# Patient Record
Sex: MALE | Race: WHITE | Employment: FULL TIME | URBAN - METROPOLITAN AREA
[De-identification: names, ages, dates, MRNs, and addresses within clinical notes are randomized per-mention and may not be internally consistent; named-entity substitution may affect disease eponyms.]

---

## 2021-02-26 ENCOUNTER — HOSPITAL ENCOUNTER (EMERGENCY)
Age: 41
Discharge: LEFT BEFORE DISCHARGE | End: 2021-02-26

## 2021-02-26 VITALS
TEMPERATURE: 97.9 F | DIASTOLIC BLOOD PRESSURE: 96 MMHG | WEIGHT: 145 LBS | RESPIRATION RATE: 18 BRPM | OXYGEN SATURATION: 100 % | BODY MASS INDEX: 23.3 KG/M2 | SYSTOLIC BLOOD PRESSURE: 108 MMHG | HEART RATE: 112 BPM | HEIGHT: 66 IN

## 2021-02-26 PROCEDURE — 80053 COMPREHEN METABOLIC PANEL: CPT

## 2021-02-26 PROCEDURE — 4500000002 HC ER NO CHARGE

## 2021-02-26 RX ORDER — 0.9 % SODIUM CHLORIDE 0.9 %
1000 INTRAVENOUS SOLUTION INTRAVENOUS ONCE
Status: DISCONTINUED | OUTPATIENT
Start: 2021-02-26 | End: 2021-02-27 | Stop reason: HOSPADM

## 2021-02-26 RX ORDER — ONDANSETRON 2 MG/ML
4 INJECTION INTRAMUSCULAR; INTRAVENOUS EVERY 6 HOURS PRN
Status: DISCONTINUED | OUTPATIENT
Start: 2021-02-26 | End: 2021-02-27 | Stop reason: HOSPADM

## 2021-02-26 RX ORDER — KETOROLAC TROMETHAMINE 30 MG/ML
30 INJECTION, SOLUTION INTRAMUSCULAR; INTRAVENOUS ONCE
Status: DISCONTINUED | OUTPATIENT
Start: 2021-02-26 | End: 2021-02-27 | Stop reason: HOSPADM

## 2021-02-27 NOTE — ED NOTES
Rounded on patient in Ed waiting area. Apologizes given for delays, patient states \" im just going to leave then, im going to go to [place]\". Explained to patient delays/ Patient refused to sign paperwork. Patient was seen by [facility] provider.       Saqib Babb, RN  02/26/21 0467

## 2021-02-27 NOTE — ED NOTES
Patient presents to Ed with complaints of emesis. Reports pain with emesis. Reports this has been occurring about monthly.  Concerned for dehydration      Roland Trimble RN  02/26/21 3644

## 2021-02-27 NOTE — ED PROVIDER NOTES
As physician-in-triage, I performed a medical screening history and physical exam on this patient. HISTORY OF PRESENT ILLNESS  Cielo Santana is a 47 y.o. male nausea vomiting diarrhea for the past 24 hours. Patient reports this is a recurrent problem occurs approximately once a month. Patient is unsure what causes it. No known medical problems. No prior belly surgeries. Patient does occasionally drink alcohol. Daily marijuana use. PHYSICAL EXAM  BP (!) 108/96   Pulse 112   Temp 97.9 °F (36.6 °C) (Oral)   Resp 18   Ht 5' 6\" (1.676 m)   Wt 145 lb (65.8 kg)   SpO2 100%   BMI 23.40 kg/m²     On exam, the patient appears in no acute distress. Speech is clear. Breathing is unlabored. Moves all extremities    Comment: Please note this report has been produced using speech recognition software and may contain errors related to that system including errors in grammar, punctuation, and spelling, as well as words and phrases that may be inappropriate. If there are any questions or concerns please feel free to contact the dictating provider for clarification.        Pamela Talley MD  02/26/21 7639

## 2021-04-12 ENCOUNTER — HOSPITAL ENCOUNTER (OUTPATIENT)
Age: 41
Setting detail: OBSERVATION
Discharge: HOME | End: 2021-04-12
Attending: INTERNAL MEDICINE | Admitting: INTERNAL MEDICINE
Payer: MEDICARE

## 2021-04-12 ENCOUNTER — APPOINTMENT (OUTPATIENT)
Dept: CT IMAGING | Age: 41
End: 2021-04-12

## 2021-04-12 VITALS
DIASTOLIC BLOOD PRESSURE: 61 MMHG | SYSTOLIC BLOOD PRESSURE: 93 MMHG | TEMPERATURE: 98.4 F | BODY MASS INDEX: 22.5 KG/M2 | OXYGEN SATURATION: 97 % | HEIGHT: 66 IN | HEART RATE: 61 BPM | WEIGHT: 140 LBS | RESPIRATION RATE: 16 BRPM

## 2021-04-12 DIAGNOSIS — R19.7 DIARRHEA, UNSPECIFIED TYPE: ICD-10-CM

## 2021-04-12 DIAGNOSIS — R11.2 NON-INTRACTABLE VOMITING WITH NAUSEA, UNSPECIFIED VOMITING TYPE: Primary | ICD-10-CM

## 2021-04-12 DIAGNOSIS — E87.6 HYPOKALEMIA: ICD-10-CM

## 2021-04-12 DIAGNOSIS — R10.12 LEFT UPPER QUADRANT ABDOMINAL PAIN: ICD-10-CM

## 2021-04-12 LAB
ALBUMIN SERPL-MCNC: 5.1 GM/DL (ref 3.4–5)
ALP BLD-CCNC: 136 IU/L (ref 40–129)
ALT SERPL-CCNC: 19 U/L (ref 10–40)
ANION GAP SERPL CALCULATED.3IONS-SCNC: 11 MMOL/L (ref 4–16)
ANION GAP SERPL CALCULATED.3IONS-SCNC: 17 MMOL/L (ref 4–16)
ANION GAP SERPL CALCULATED.3IONS-SCNC: 9 MMOL/L (ref 4–16)
AST SERPL-CCNC: 20 IU/L (ref 15–37)
BASE EXCESS MIXED: 8.5 (ref 0–1.2)
BASOPHILS ABSOLUTE: 0 K/CU MM
BASOPHILS RELATIVE PERCENT: 0.2 % (ref 0–1)
BILIRUB SERPL-MCNC: 0.4 MG/DL (ref 0–1)
BUN BLDV-MCNC: 14 MG/DL (ref 6–23)
BUN BLDV-MCNC: 17 MG/DL (ref 6–23)
BUN BLDV-MCNC: 17 MG/DL (ref 6–23)
CALCIUM SERPL-MCNC: 10.6 MG/DL (ref 8.3–10.6)
CALCIUM SERPL-MCNC: 8.9 MG/DL (ref 8.3–10.6)
CALCIUM SERPL-MCNC: 8.9 MG/DL (ref 8.3–10.6)
CHLORIDE BLD-SCNC: 82 MMOL/L (ref 99–110)
CHLORIDE BLD-SCNC: 91 MMOL/L (ref 99–110)
CHLORIDE BLD-SCNC: 96 MMOL/L (ref 99–110)
CO2: 29 MMOL/L (ref 21–32)
CO2: 34 MMOL/L (ref 21–32)
CO2: 39 MMOL/L (ref 21–32)
COMMENT: ABNORMAL
CREAT SERPL-MCNC: 1 MG/DL (ref 0.9–1.3)
CREAT SERPL-MCNC: 1.1 MG/DL (ref 0.9–1.3)
CREAT SERPL-MCNC: 1.4 MG/DL (ref 0.9–1.3)
DIFFERENTIAL TYPE: ABNORMAL
EOSINOPHILS ABSOLUTE: 0 K/CU MM
EOSINOPHILS RELATIVE PERCENT: 0 % (ref 0–3)
GFR AFRICAN AMERICAN: >60 ML/MIN/1.73M2
GFR NON-AFRICAN AMERICAN: 57 ML/MIN/1.73M2
GFR NON-AFRICAN AMERICAN: >60 ML/MIN/1.73M2
GFR NON-AFRICAN AMERICAN: >60 ML/MIN/1.73M2
GLUCOSE BLD-MCNC: 114 MG/DL (ref 70–99)
GLUCOSE BLD-MCNC: 173 MG/DL (ref 70–99)
GLUCOSE BLD-MCNC: 96 MG/DL (ref 70–99)
HCO3 VENOUS: 31.8 MMOL/L (ref 19–25)
HCT VFR BLD CALC: 52.4 % (ref 42–52)
HEMOGLOBIN: 18.2 GM/DL (ref 13.5–18)
IMMATURE NEUTROPHIL %: 0.4 % (ref 0–0.43)
LIPASE: 14 IU/L (ref 13–60)
LYMPHOCYTES ABSOLUTE: 1.5 K/CU MM
LYMPHOCYTES RELATIVE PERCENT: 9.2 % (ref 24–44)
MAGNESIUM: 2 MG/DL (ref 1.8–2.4)
MCH RBC QN AUTO: 30.6 PG (ref 27–31)
MCHC RBC AUTO-ENTMCNC: 34.7 % (ref 32–36)
MCV RBC AUTO: 88.2 FL (ref 78–100)
MONOCYTES ABSOLUTE: 0.9 K/CU MM
MONOCYTES RELATIVE PERCENT: 5.8 % (ref 0–4)
NUCLEATED RBC %: 0 %
O2 SAT, VEN: 92.9 % (ref 50–70)
PCO2, VEN: 38 MMHG (ref 38–52)
PDW BLD-RTO: 13.6 % (ref 11.7–14.9)
PH VENOUS: 7.53 (ref 7.32–7.42)
PLATELET # BLD: 366 K/CU MM (ref 140–440)
PMV BLD AUTO: 9.8 FL (ref 7.5–11.1)
PO2, VEN: 178 MMHG (ref 28–48)
POTASSIUM SERPL-SCNC: 2.9 MMOL/L (ref 3.5–5.1)
POTASSIUM SERPL-SCNC: 3.8 MMOL/L (ref 3.5–5.1)
POTASSIUM SERPL-SCNC: 3.9 MMOL/L (ref 3.5–5.1)
RBC # BLD: 5.94 M/CU MM (ref 4.6–6.2)
SEGMENTED NEUTROPHILS ABSOLUTE COUNT: 13.2 K/CU MM
SEGMENTED NEUTROPHILS RELATIVE PERCENT: 84.4 % (ref 36–66)
SODIUM BLD-SCNC: 134 MMOL/L (ref 135–145)
SODIUM BLD-SCNC: 136 MMOL/L (ref 135–145)
SODIUM BLD-SCNC: 138 MMOL/L (ref 135–145)
TOTAL IMMATURE NEUTOROPHIL: 0.06 K/CU MM
TOTAL NUCLEATED RBC: 0 K/CU MM
TOTAL PROTEIN: 8.6 GM/DL (ref 6.4–8.2)
TROPONIN T: <0.01 NG/ML
WBC # BLD: 15.7 K/CU MM (ref 4–10.5)

## 2021-04-12 PROCEDURE — 84484 ASSAY OF TROPONIN QUANT: CPT

## 2021-04-12 PROCEDURE — 74177 CT ABD & PELVIS W/CONTRAST: CPT

## 2021-04-12 PROCEDURE — 80048 BASIC METABOLIC PNL TOTAL CA: CPT

## 2021-04-12 PROCEDURE — 99285 EMERGENCY DEPT VISIT HI MDM: CPT

## 2021-04-12 PROCEDURE — 83735 ASSAY OF MAGNESIUM: CPT

## 2021-04-12 PROCEDURE — G0378 HOSPITAL OBSERVATION PER HR: HCPCS

## 2021-04-12 PROCEDURE — 96374 THER/PROPH/DIAG INJ IV PUSH: CPT

## 2021-04-12 PROCEDURE — 83690 ASSAY OF LIPASE: CPT

## 2021-04-12 PROCEDURE — 6360000002 HC RX W HCPCS: Performed by: PHYSICIAN ASSISTANT

## 2021-04-12 PROCEDURE — 36415 COLL VENOUS BLD VENIPUNCTURE: CPT

## 2021-04-12 PROCEDURE — 2580000003 HC RX 258: Performed by: EMERGENCY MEDICINE

## 2021-04-12 PROCEDURE — 85025 COMPLETE CBC W/AUTO DIFF WBC: CPT

## 2021-04-12 PROCEDURE — 2500000003 HC RX 250 WO HCPCS: Performed by: EMERGENCY MEDICINE

## 2021-04-12 PROCEDURE — 6360000004 HC RX CONTRAST MEDICATION: Performed by: EMERGENCY MEDICINE

## 2021-04-12 PROCEDURE — 96372 THER/PROPH/DIAG INJ SC/IM: CPT

## 2021-04-12 PROCEDURE — 96376 TX/PRO/DX INJ SAME DRUG ADON: CPT

## 2021-04-12 PROCEDURE — 80076 HEPATIC FUNCTION PANEL: CPT

## 2021-04-12 PROCEDURE — 82805 BLOOD GASES W/O2 SATURATION: CPT

## 2021-04-12 PROCEDURE — 80053 COMPREHEN METABOLIC PANEL: CPT

## 2021-04-12 PROCEDURE — 2580000003 HC RX 258: Performed by: PHYSICIAN ASSISTANT

## 2021-04-12 PROCEDURE — 94761 N-INVAS EAR/PLS OXIMETRY MLT: CPT

## 2021-04-12 PROCEDURE — 93010 ELECTROCARDIOGRAM REPORT: CPT | Performed by: INTERNAL MEDICINE

## 2021-04-12 PROCEDURE — 96361 HYDRATE IV INFUSION ADD-ON: CPT

## 2021-04-12 PROCEDURE — 96375 TX/PRO/DX INJ NEW DRUG ADDON: CPT

## 2021-04-12 PROCEDURE — 6360000002 HC RX W HCPCS: Performed by: EMERGENCY MEDICINE

## 2021-04-12 PROCEDURE — 93005 ELECTROCARDIOGRAM TRACING: CPT | Performed by: EMERGENCY MEDICINE

## 2021-04-12 PROCEDURE — 6360000002 HC RX W HCPCS: Performed by: INTERNAL MEDICINE

## 2021-04-12 RX ORDER — SODIUM CHLORIDE 0.9 % (FLUSH) 0.9 %
5-40 SYRINGE (ML) INJECTION PRN
Status: DISCONTINUED | OUTPATIENT
Start: 2021-04-12 | End: 2021-04-13 | Stop reason: HOSPADM

## 2021-04-12 RX ORDER — POLYETHYLENE GLYCOL 3350 17 G/17G
17 POWDER, FOR SOLUTION ORAL DAILY PRN
Status: DISCONTINUED | OUTPATIENT
Start: 2021-04-12 | End: 2021-04-13 | Stop reason: HOSPADM

## 2021-04-12 RX ORDER — POTASSIUM CHLORIDE 7.45 MG/ML
10 INJECTION INTRAVENOUS PRN
Status: DISCONTINUED | OUTPATIENT
Start: 2021-04-12 | End: 2021-04-12 | Stop reason: SDUPTHER

## 2021-04-12 RX ORDER — 0.9 % SODIUM CHLORIDE 0.9 %
1000 INTRAVENOUS SOLUTION INTRAVENOUS ONCE
Status: COMPLETED | OUTPATIENT
Start: 2021-04-12 | End: 2021-04-12

## 2021-04-12 RX ORDER — SODIUM CHLORIDE 9 MG/ML
INJECTION, SOLUTION INTRAVENOUS CONTINUOUS
Status: DISCONTINUED | OUTPATIENT
Start: 2021-04-12 | End: 2021-04-13 | Stop reason: HOSPADM

## 2021-04-12 RX ORDER — PROMETHAZINE HYDROCHLORIDE 25 MG/1
12.5 TABLET ORAL EVERY 6 HOURS PRN
Status: DISCONTINUED | OUTPATIENT
Start: 2021-04-12 | End: 2021-04-13 | Stop reason: HOSPADM

## 2021-04-12 RX ORDER — POTASSIUM CHLORIDE 7.45 MG/ML
10 INJECTION INTRAVENOUS PRN
Status: DISCONTINUED | OUTPATIENT
Start: 2021-04-12 | End: 2021-04-13 | Stop reason: HOSPADM

## 2021-04-12 RX ORDER — SODIUM CHLORIDE 0.9 % (FLUSH) 0.9 %
5-40 SYRINGE (ML) INJECTION EVERY 12 HOURS SCHEDULED
Status: DISCONTINUED | OUTPATIENT
Start: 2021-04-12 | End: 2021-04-13 | Stop reason: HOSPADM

## 2021-04-12 RX ORDER — DIPHENHYDRAMINE HYDROCHLORIDE 50 MG/ML
50 INJECTION INTRAMUSCULAR; INTRAVENOUS ONCE
Status: COMPLETED | OUTPATIENT
Start: 2021-04-12 | End: 2021-04-12

## 2021-04-12 RX ORDER — ONDANSETRON 2 MG/ML
4 INJECTION INTRAMUSCULAR; INTRAVENOUS EVERY 6 HOURS PRN
Status: DISCONTINUED | OUTPATIENT
Start: 2021-04-12 | End: 2021-04-13 | Stop reason: HOSPADM

## 2021-04-12 RX ORDER — SODIUM CHLORIDE 0.9 % (FLUSH) 0.9 %
5-40 SYRINGE (ML) INJECTION 2 TIMES DAILY
Status: DISCONTINUED | OUTPATIENT
Start: 2021-04-12 | End: 2021-04-13 | Stop reason: HOSPADM

## 2021-04-12 RX ORDER — ACETAMINOPHEN 325 MG/1
650 TABLET ORAL EVERY 6 HOURS PRN
Status: DISCONTINUED | OUTPATIENT
Start: 2021-04-12 | End: 2021-04-13 | Stop reason: HOSPADM

## 2021-04-12 RX ORDER — SODIUM CHLORIDE 9 MG/ML
25 INJECTION, SOLUTION INTRAVENOUS PRN
Status: DISCONTINUED | OUTPATIENT
Start: 2021-04-12 | End: 2021-04-13 | Stop reason: HOSPADM

## 2021-04-12 RX ORDER — POTASSIUM CHLORIDE 20 MEQ/1
40 TABLET, EXTENDED RELEASE ORAL PRN
Status: DISCONTINUED | OUTPATIENT
Start: 2021-04-12 | End: 2021-04-13 | Stop reason: HOSPADM

## 2021-04-12 RX ORDER — SODIUM CHLORIDE 9 MG/ML
INJECTION, SOLUTION INTRAVENOUS CONTINUOUS
Status: DISCONTINUED | OUTPATIENT
Start: 2021-04-12 | End: 2021-04-12

## 2021-04-12 RX ORDER — ONDANSETRON 4 MG/1
4 TABLET, ORALLY DISINTEGRATING ORAL 3 TIMES DAILY PRN
Qty: 21 TABLET | Refills: 0 | Status: SHIPPED | OUTPATIENT
Start: 2021-04-12 | End: 2021-05-11

## 2021-04-12 RX ORDER — HALOPERIDOL 5 MG/ML
5 INJECTION INTRAMUSCULAR ONCE
Status: COMPLETED | OUTPATIENT
Start: 2021-04-12 | End: 2021-04-12

## 2021-04-12 RX ADMIN — POTASSIUM CHLORIDE 10 MEQ: 7.46 INJECTION, SOLUTION INTRAVENOUS at 14:12

## 2021-04-12 RX ADMIN — POTASSIUM CHLORIDE 10 MEQ: 7.46 INJECTION, SOLUTION INTRAVENOUS at 12:31

## 2021-04-12 RX ADMIN — SODIUM CHLORIDE 1000 ML: 9 INJECTION, SOLUTION INTRAVENOUS at 02:31

## 2021-04-12 RX ADMIN — SODIUM CHLORIDE 1000 ML: 9 INJECTION, SOLUTION INTRAVENOUS at 05:33

## 2021-04-12 RX ADMIN — POTASSIUM CHLORIDE 10 MEQ: 7.46 INJECTION, SOLUTION INTRAVENOUS at 07:12

## 2021-04-12 RX ADMIN — SODIUM CHLORIDE, PRESERVATIVE FREE 10 ML: 5 INJECTION INTRAVENOUS at 10:09

## 2021-04-12 RX ADMIN — DIPHENHYDRAMINE HYDROCHLORIDE 50 MG: 50 INJECTION INTRAMUSCULAR; INTRAVENOUS at 02:32

## 2021-04-12 RX ADMIN — HALOPERIDOL LACTATE 5 MG: 5 INJECTION, SOLUTION INTRAMUSCULAR at 02:31

## 2021-04-12 RX ADMIN — ENOXAPARIN SODIUM 40 MG: 40 INJECTION SUBCUTANEOUS at 10:07

## 2021-04-12 RX ADMIN — SODIUM CHLORIDE: 9 INJECTION, SOLUTION INTRAVENOUS at 07:26

## 2021-04-12 RX ADMIN — FAMOTIDINE 20 MG: 10 INJECTION, SOLUTION INTRAVENOUS at 02:32

## 2021-04-12 RX ADMIN — IOPAMIDOL 75 ML: 755 INJECTION, SOLUTION INTRAVENOUS at 03:37

## 2021-04-12 RX ADMIN — POTASSIUM CHLORIDE 10 MEQ: 7.46 INJECTION, SOLUTION INTRAVENOUS at 10:07

## 2021-04-12 ASSESSMENT — PAIN SCALES - GENERAL: PAINLEVEL_OUTOF10: 0

## 2021-04-12 NOTE — ED NOTES
Called and spoke to fredi Camargo and he states he has BMP to run. Spoke to Susi MALONEY and she will go get VBG out of the tube system.       Rodrick Velez, RN  04/12/21 1146

## 2021-04-12 NOTE — ED PROVIDER NOTES
As physician-in-triage, I performed a virtual medical screening history and physical exam on this patient. HISTORY OF PRESENT ILLNESS  Meli Overton is a 47 y.o. male who presents to the emergency department with complaints of left upper quadrant abdominal pain with persistent nausea and vomiting over the last 30 hours or so. The pain describes a stabbing pain rated 10/10 with associated intractable nausea and vomiting. Pain radiates throughout the left side of his abdomen. The pain is constant. Nothing seems to make it better. Eating or drinking make it worse. He experienced similar symptoms in February and reports emergency department for evaluation but became frustrated with the wait and left. He did not seek any further evaluation after that. No prior abdominal surgeries. Denies fevers, chills, chest pain, shortness of breath     PHYSICAL EXAM  /76   Pulse 118   Temp 98.7 °F (37.1 °C) (Oral)   Resp 18   Ht 5' 6\" (1.676 m)   Wt 140 lb (63.5 kg)   SpO2 100%   BMI 22.60 kg/m²     On exam, the patient appears uncomfortable. Speech is clear. Breathing is unlabored. Moves all extremities. Noticed to cry out in pain with blood pressure cuff inflation and state the blood pressure cuff is too much pain. Orders: CBC, BMP, hepatic panel, lipase, EKG, troponin, CT of the abdomen/pelvis, IV fluid bolus, Haldol, Benadryl, Pepcid.         [address],   04/12/21 407 [address] [place] [address], DO  04/12/21 5849

## 2021-04-12 NOTE — ED PROVIDER NOTES
EMERGENCY DEPARTMENT ENCOUNTER      PCP: No primary care provider on file. CHIEF COMPLAINT    Chief Complaint   Patient presents with    Abdominal Pain     x 30 hours    Nausea    Emesis       This patient was not evaluated by the attending physician. I have independently evaluated this patient. My supervising physician was available for consultation. HPI    Saima Child is a 47 y.o. male who presents with abdominal pain, nausea, vomiting, and diarrhea. Onset was about 30 hrs ago. Duration of symptoms have been mostly constant since onset. Location of pain is located in the left upper quadrant of abdomen. Severity of pain is rated 8 out of 10. Patient reports that pain seems to improve slightly with a warm bath but then returns when he gets out of the bath. Aggravating factors eating and drinking worsen his symptoms. He reports he has been unable to keep down food or liquids. Pain does not radiate. Patient reports multiple episodes of nonbloody emesis and multiple episodes of nonbloody diarrhea. Patient reports that he seems to be getting the symptoms approximately once a month for the last 8 months. He did come to the ED in February to be evaluated but became frustrated with the wait and left. Patient does admit to daily marijuana usage. Patient reports he drinks alcohol approximately once every 2 weeks. Patient denies fever, chills, chest pain, shortness of breath, urinary symptoms, constipation, melena, hematochezia, hematemesis. REVIEW OF SYSTEMS    Constitutional:  Denies fever, chills  HENT:  Denies sore throat or ear pain   Cardiovascular:  Denies chest pain, palpitations or swelling   Respiratory:  Denies cough or shortness of breath   GI:  See HPI above  : No hematuria, urinary urgency, urinary frequency, dysuria. Musculoskeletal:  Denies back pain or groin pain or masses. No pain or swelling of extremities.   Skin:  Denies rash  Neurologic:  Denies headache, focal weakness or sensory changes   Endocrine:  Denies polyuria or polydypsia   Lymphatic:  Denies swollen glands     All other review of systems are negative  See HPI and nursing notes for additional information     [address]    History reviewed. No pertinent past medical history. History reviewed. No pertinent surgical history. CURRENT MEDICATIONS    None    ALLERGIES    No Known Allergies    SOCIAL AND FAMILY HISTORY    Social History     Socioeconomic History    Marital status:      Spouse name: None    Number of children: None    Years of education: None    Highest education level: None   Occupational History    None   Social Needs    Financial resource strain: None    Food insecurity     Worry: None     Inability: None    Transportation needs     Medical: None     Non-medical: None   Tobacco Use    Smoking status: Current Every Day Smoker   Substance and Sexual Activity    Alcohol use: No    Drug use: No    Sexual activity: Never   Lifestyle    Physical activity     Days per week: None     Minutes per session: None    Stress: None   Relationships    Social connections     Talks on phone: None     Gets together: None     Attends Holiness service: None     Active member of club or organization: None     Attends meetings of clubs or organizations: None     Relationship status: None    Intimate partner violence     Fear of current or ex partner: None     Emotionally abused: None     Physically abused: None     Forced sexual activity: None   Other Topics Concern    None   Social History Narrative    None     History reviewed. No pertinent family history. PHYSICAL EXAM    VITAL SIGNS: BP (!) 116/99   Pulse 86   Temp 98.7 °F (37.1 °C) (Oral)   Resp 18   Ht 5' 6\" (1.676 m)   Wt 140 lb (63.5 kg)   SpO2 96%   BMI 22.60 kg/m²   Constitutional:  Well developed, well nourished. No distress  Eyes:  Sclera nonicteric, conjunctiva moist  HENT:  Atraumatic. PERRL. EOMI. Moist mucus membranes. Neck/Lymphatics: supple, no JVD, no swollen nodes  Respiratory:  No retractions, no accessory muscle use, normal breath sounds   Cardiovascular:  normal rate, normal rhythm, no murmurs    GI:    No gross discoloration.       -no Nicholas's sign (periumbilical ecchymosis)       -no Grey-Acosta's sign (flank ecchymosis)    Bowel sounds present, no audible bruits. Soft, no distention, no guarding, no rigidity,   + LUQ abdominal tenderness to palpation-no other abdominal tenderness palpation on exam, no rebound tenderness, no palpable pulsatile masses,   No McBurney's point tenderness   Negative Rovsing sign    Negative Ross's sign. Back:  No CVA tenderness to percussion.   Musculoskeletal:  No edema, no deformity  Vascular: DP pulses 2+ equal bilaterally  Integument: No rash, dry skin  Neurologic:  Alert & oriented, normal speech  Psychiatric: Cooperative, pleasant affect       LABS:  Results for orders placed or performed during the hospital encounter of 04/12/21   CBC auto diff   Result Value Ref Range    WBC 15.7 (H) 4.0 - 10.5 K/CU MM    RBC 5.94 4.6 - 6.2 M/CU MM    Hemoglobin 18.2 (H) 13.5 - 18.0 GM/DL    Hematocrit 52.4 (H) 42 - 52 %    MCV 88.2 78 - 100 FL    MCH 30.6 27 - 31 PG    MCHC 34.7 32.0 - 36.0 %    RDW 13.6 11.7 - 14.9 %    Platelets 433 369 - 327 K/CU MM    MPV 9.8 7.5 - 11.1 FL    Differential Type AUTOMATED DIFFERENTIAL     Segs Relative 84.4 (H) 36 - 66 %    Lymphocytes % 9.2 (L) 24 - 44 %    Monocytes % 5.8 (H) 0 - 4 %    Eosinophils % 0.0 0 - 3 %    Basophils % 0.2 0 - 1 %    Segs Absolute 13.2 K/CU MM    Lymphocytes Absolute 1.5 K/CU MM    Monocytes Absolute 0.9 K/CU MM    Eosinophils Absolute 0.0 K/CU MM    Basophils Absolute 0.0 K/CU MM    Nucleated RBC % 0.0 %    Total Nucleated RBC 0.0 K/CU MM    Total Immature Neutrophil 0.06 K/CU MM    Immature Neutrophil % 0.4 0 - 0.43 %   Lipase   Result Value Ref Range    Lipase 14 13 - 60 IU/L   Troponin   Result Value Ref Range    Troponin T <0.010 <0.01 NG/ML   Comprehensive Metabolic Panel   Result Value Ref Range    Sodium 138 135 - 145 MMOL/L    Potassium 3.9 3.5 - 5.1 MMOL/L    Chloride 82 (L) 99 - 110 mMol/L    CO2 39 (H) 21 - 32 MMOL/L    BUN 17 6 - 23 MG/DL    CREATININE 1.4 (H) 0.9 - 1.3 MG/DL    Glucose 173 (H) 70 - 99 MG/DL    Calcium 10.6 8.3 - 10.6 MG/DL    Albumin 5.1 (H) 3.4 - 5.0 GM/DL    Total Protein 8.6 (H) 6.4 - 8.2 GM/DL    Total Bilirubin 0.4 0.0 - 1.0 MG/DL    ALT 19 10 - 40 U/L    AST 20 15 - 37 IU/L    Alkaline Phosphatase 136 (H) 40 - 129 IU/L    GFR Non- 57 (L) >60 mL/min/1.73m2    GFR African American >60 >60 mL/min/1.73m2    Anion Gap 17 (H) 4 - 16   BMP   Result Value Ref Range    Sodium 136 135 - 145 MMOL/L    Potassium 2.9 (LL) 3.5 - 5.1 MMOL/L    Chloride 91 (L) 99 - 110 mMol/L    CO2 34 (H) 21 - 32 MMOL/L    Anion Gap 11 4 - 16    BUN 17 6 - 23 MG/DL    CREATININE 1.1 0.9 - 1.3 MG/DL    Glucose 114 (H) 70 - 99 MG/DL    Calcium 8.9 8.3 - 10.6 MG/DL    GFR Non-African American >60 >60 mL/min/1.73m2    GFR African American >60 >60 mL/min/1.73m2   EKG 12 Lead   Result Value Ref Range    Ventricular Rate 109 BPM    Atrial Rate 109 BPM    P-R Interval 140 ms    QRS Duration 80 ms    Q-T Interval 378 ms    QTc Calculation (Bazett) 509 ms    P Axis 82 degrees    R Axis 113 degrees    T Axis 64 degrees    Diagnosis       Sinus tachycardia  Biatrial enlargement  Right axis deviation  Pulmonary disease pattern  Abnormal ECG  No previous ECGs available         EKG Interpretation  Please see ED physician's note for EKG interpretation - Dr. Burnell Bernheim      RADIOLOGY/PROCEDURES    CT ABDOMEN PELVIS W IV CONTRAST Additional Contrast? None   Final Result   No CT findings to explain left upper quadrant pain. Dependent ground-glass   airspace opacities in the left lower lobe most likely reflect subsegmental   atelectasis.   Pneumonia is felt to be less likely but could be considered in   the right consistent with cannabinoid hyperemesis syndrome. BMP now reveals significant hypokalemia of 2.9 and bicarb is still elevated at 34. Gap has closed. Creatinine has improved at 1.1. IV potassium replacement ordered and magnesium level added on. At this time will plan for admission. I consulted hospitalist, Dr. Sagar Beckett, and we discussed the patient's case. Hospitalist agrees to admit patient at this time for further evaluation and care. All pertinent Lab data and radiographic results reviewed with patient at bedside. The patient and/or the family were informed of the results of any tests/labs/imaging, the treatment plan, and time was allotted to answer questions. Diagnosis, disposition, and treatment plan reviewed in detail with patient. Patient understands and agrees to admission at this time. In consideration of current COVID19 pandemic, with effort to minimize unnecessary provider exposure, this patient was seen at bedside by me independently. However, in compliance with current hospital SUSU/ED protocol, prior to admission I did discuss this patient case with emergency department physician, Dr. Josiah Isaac. Clinical  IMPRESSION    1. Non-intractable vomiting with nausea, unspecified vomiting type    2. Diarrhea, unspecified type    3. Left upper quadrant abdominal pain    4. Hypokalemia          Comment: Please note this report has been produced using speech recognition software and may contain errors related to that system including errors in grammar, punctuation, and spelling, as well as words and phrases that may be inappropriate. If there are any questions or concerns please feel free to contact the dictating provider for clarification.            Manohar Jones PA-C  04/12/21 9722

## 2021-04-12 NOTE — H&P
History and Physical      Name:  Tio Huerta /Age/Sex: 1980  (47 y.o. male)   MRN & CSN:  [id number] & [id number] Admission Date/Time: 2021  1:52 AM   Location:  ED17/ED-17 PCP: No primary care provider on file. Hospital Day: 1    Assessment and Plan: Tio Huerta is a 47 y.o.  male  who presents with:    Abdominal pain with nausea and vomiting-suspected cannabinoid hyperemesis syndrome  -We will admit  -Symptomatic care with antiemetics    Hypokalemia with potassium level 2.9 due to above  -Replace intravenously    Dehydration  -We will hydrate him    Daily cannabinoid use-recommend cessation    Lung nodule-5 mm seen on CT scan    History of Present Illness:     Chief Complaint:   Tio Huerta is a 47 y.o.  male  who presents with abdominal pain and nausea and vomiting    Amalia Valdez is a pleasant 79-year-old who indicates that on Saturday, April 10 he started having left sided abdominal pain. It started after he had some Quince Blander. He subsequently started vomiting. The following day on  morning he had some diarrhea. After that he had some hiccups and then started vomiting every 15 to 20 minutes. He could not keep anything down, even an ice cube. He states that this has happened 4 times this year. He has never been admitted before. He took a bath and that helped the pain a lot. .    I spoke with the ED provider who informed me that the patient has been in the ED for 6 hours. He had some left upper quadrant pain and nausea and vomiting and uses marijuana. He had some diarrhea. The vomiting was better. Blood work showed elevated serum bicarb of 39 and an anion gap of 17. He was given 1 L in the ED. CT did not reveal any acute pathology. It did show dependent groundglass airspace opacities in the left lower lobe most likely reflecting subsegmental atelectasis. Pneumonia is a less likely consideration. There was also a 5 mm lung nodule when I reviewed the report.   Potassium level came back at 2.9 on repeat BMP and bicarb improved to 34 and I was asked to admit him. PCP is  in [facility]. 10-14 point ROS reviewed negative, unless as noted above  -No fever or bleeding    Objective:   No intake or output data in the 24 hours ending 21 0730   Vitals:   Vitals:    21 0200   BP: (!) 116/99   Pulse: 86   Resp:    Temp:    SpO2: 96%     Physical Exam:      GEN Awake male, sitting upright in bed. Kari Rued Appears given age. Body mass index is 22.6 kg/m². EYES extraocular muscles intact and no scleral icterus. RESP Clear to auscultation, no wheezes, rales or rhonchi. Symmetric chest movement while on room air. CARDIO/VASC S1/S2 auscultated. Regular rate without appreciable murmurs, rubs, or gallops. GI Abdomen is soft without guarding rebound or rigidity.  Fontenot catheter is not present. SKIN Normal coloration, warm, dry. NEURO Cranial nerves appear grossly intact, normal speech, no lateralizing weakness. PSYCH Awake, alert, oriented x 4. Affect appropriate. Past Medical History: PMHx: He denies any medical problems  PSHx: Shoulder surgery. He reports that he has a steel plate in his shoulder. This was apparently done after an injury to his clavicle 3 years ago.   Allergies: No Known Allergies  Home Medications: None except for occasional ibuprofen    FHx: Father is   SHx: He uses marijuana daily  Social History     Socioeconomic History    Marital status:      Spouse name: None    Number of children: None    Years of education: None    Highest education level: None   Occupational History    None   Social Needs    Financial resource strain: None    Food insecurity     Worry: None     Inability: None    Transportation needs     Medical: None     Non-medical: None   Tobacco Use    Smoking status: Current Every Day Smoker   Substance and Sexual Activity    Alcohol use: No    Drug use: No    Sexual activity: Never   Lifestyle    Physical activity     Days per week: None     Minutes per session: None    Stress: None   Relationships    Social connections     Talks on phone: None     Gets together: None     Attends Jain service: None     Active member of club or organization: None     Attends meetings of clubs or organizations: None     Relationship status: None    Intimate partner violence     Fear of current or ex partner: None     Emotionally abused: None     Physically abused: None     Forced sexual activity: None   Other Topics Concern    None   Social History Narrative    None     I spoke with the ED provider, and we decided to admit him    Potassium level is 2.9    Electronically signed by Allie Antonio MD on 4/12/2021 at 7:30 AM

## 2021-04-12 NOTE — ED NOTES
Spoke to Mariaelena Sepulveda, she is ordering 25cc/hr 0.9 fluid to run with potassium      Arnold Pena, ISRAEL  04/12/21 0385

## 2021-04-12 NOTE — ED NOTES
FTF report given to [facility] for end of shift coverage and assumption of care.        Willy Tim RN  04/12/21 5111

## 2021-04-13 LAB
EKG ATRIAL RATE: 109 BPM
EKG DIAGNOSIS: NORMAL
EKG P AXIS: 82 DEGREES
EKG P-R INTERVAL: 140 MS
EKG Q-T INTERVAL: 378 MS
EKG QRS DURATION: 80 MS
EKG QTC CALCULATION (BAZETT): 509 MS
EKG R AXIS: 113 DEGREES
EKG T AXIS: 64 DEGREES
EKG VENTRICULAR RATE: 109 BPM

## 2021-04-14 NOTE — DISCHARGE SUMMARY
Discharge Summary    Name:  Harrison Pride /Age/Sex: 1980  (47 y.o. male)   MRN & CSN:  [id number] & [id number] Admission Date/Time: 2021  1:52 AM   Attending:  No att. providers found Discharging Physician: Valdemar Mcwilliams MD     HPI:     Harrison Pride is a 47 y.o.  male  who presents with abdominal pain and nausea and vomiting     Yuly Méndez is a pleasant 37-year-old who indicates that on Saturday, April 10 he started having left sided abdominal pain. It started after he had some Krystyna Guardian. He subsequently started vomiting. The following day on  morning he had some diarrhea. After that he had some hiccups and then started vomiting every 15 to 20 minutes. He could not keep anything down, even an ice cube. He states that this has happened 4 times this year. He has never been admitted before. He took a bath and that helped the pain a lot. .     I spoke with the ED provider who informed me that the patient has been in the ED for 6 hours. He had some left upper quadrant pain and nausea and vomiting and uses marijuana. He had some diarrhea. The vomiting was better. Blood work showed elevated serum bicarb of 39 and an anion gap of 17. He was given 1 L in the ED. CT did not reveal any acute pathology. It did show dependent groundglass airspace opacities in the left lower lobe most likely reflecting subsegmental atelectasis. Pneumonia is a less likely consideration. There was also a 5 mm lung nodule when I reviewed the report. Potassium level came back at 2.9 on repeat BMP and bicarb improved to 34 and I was asked to admit him. PCP is  in [facility]. Hospital Course: Yuly Méndez is a pleasant 37-year-old who presented with abdominal pain, nausea and vomiting. He has had 4 other similar episodes this year. He uses marijuana daily. His potassium was 2.9 in the ED and he was quite alkalotic and appeared dehydrated. He was admitted. Potassium was replaced.   He was hydrated. His symptoms resolved. I admitted him at around 9:10 AM this morning. In the afternoon I received a call that he wants to go home as he felt much better. His potassium was rechecked and it was normal.  He was released home at 7 PM.  He appears to have cannabinoid hyperemesis syndrome. I did speak with the  place information in the chart about where he can go for assistance with this. Problem list    Abdominal pain with nausea and vomiting-suspected cannabinoid hyperemesis syndrome  -He had 4 other similar episodes this year, but was not admitted for any of them  -Symptoms have now essentially resolved     Hypokalemia with potassium level 2.9 due to above  -Replaced intravenously, corrected     Dehydration  -He was hydrated     Daily cannabinoid use-recommend cessation     Lung nodule-5 mm seen on CT scan  -Radiologist did not recommend routine follow-up, however per radiologist these guidelines do not apply to immunocompromised patients and per patient's with cancer. He has a history of neither. This report will be routed to his primary care provider. The patient expressed appropriate understanding of and agreement with the discharge recommendations, medications, and plan.      Consults this admission:  IP CONSULT TO HOSPITALIST    Discharge Instruction:   Follow up appointments: Primary care  Primary care physician:  within 2 weeks    Diet:  regular diet   Activity: activity as tolerated  Disposition: Discharged to:   [x]Home, []C, []SNF, []Acute Rehab, []Hospice   Condition on discharge: Stable    Discharge Medications:      Arvind Sanchez   Home Medication Instructions NICOLAS:[id number]    Printed on:04/14/21 5486   Medication Information                      ondansetron (ZOFRAN-ODT) 4 MG disintegrating tablet  Take 1 tablet by mouth 3 times daily as needed for Nausea or Vomiting                 Objective Findings at Discharge:   BP 93/61   Pulse 61   Temp 98.4 °F (36.9 °C)

## 2021-05-11 ENCOUNTER — HOSPITAL ENCOUNTER (EMERGENCY)
Age: 41
Discharge: HOME | End: 2021-05-11
Payer: MEDICARE

## 2021-05-11 VITALS
DIASTOLIC BLOOD PRESSURE: 55 MMHG | BODY MASS INDEX: 22.5 KG/M2 | WEIGHT: 140 LBS | HEART RATE: 92 BPM | HEIGHT: 66 IN | OXYGEN SATURATION: 100 % | RESPIRATION RATE: 16 BRPM | SYSTOLIC BLOOD PRESSURE: 106 MMHG | TEMPERATURE: 98.5 F

## 2021-05-11 DIAGNOSIS — E87.6 HYPOKALEMIA: ICD-10-CM

## 2021-05-11 DIAGNOSIS — R79.89 ELEVATED LACTIC ACID LEVEL: ICD-10-CM

## 2021-05-11 DIAGNOSIS — R10.9 ABDOMINAL PAIN, UNSPECIFIED ABDOMINAL LOCATION: ICD-10-CM

## 2021-05-11 DIAGNOSIS — R11.2 NON-INTRACTABLE VOMITING WITH NAUSEA, UNSPECIFIED VOMITING TYPE: Primary | ICD-10-CM

## 2021-05-11 LAB
ALBUMIN SERPL-MCNC: 5.2 GM/DL (ref 3.4–5)
ALP BLD-CCNC: 119 IU/L (ref 40–129)
ALT SERPL-CCNC: 14 U/L (ref 10–40)
ANION GAP SERPL CALCULATED.3IONS-SCNC: 10 MMOL/L (ref 4–16)
ANION GAP SERPL CALCULATED.3IONS-SCNC: 14 MMOL/L (ref 4–16)
AST SERPL-CCNC: 16 IU/L (ref 15–37)
BASOPHILS ABSOLUTE: 0 K/CU MM
BASOPHILS RELATIVE PERCENT: 0.2 % (ref 0–1)
BILIRUB SERPL-MCNC: 0.3 MG/DL (ref 0–1)
BUN BLDV-MCNC: 16 MG/DL (ref 6–23)
BUN BLDV-MCNC: 17 MG/DL (ref 6–23)
CALCIUM SERPL-MCNC: 10.7 MG/DL (ref 8.3–10.6)
CALCIUM SERPL-MCNC: 8.7 MG/DL (ref 8.3–10.6)
CHLORIDE BLD-SCNC: 87 MMOL/L (ref 99–110)
CHLORIDE BLD-SCNC: 99 MMOL/L (ref 99–110)
CO2: 33 MMOL/L (ref 21–32)
CO2: 38 MMOL/L (ref 21–32)
CREAT SERPL-MCNC: 1.1 MG/DL (ref 0.9–1.3)
CREAT SERPL-MCNC: 1.3 MG/DL (ref 0.9–1.3)
DIFFERENTIAL TYPE: ABNORMAL
EOSINOPHILS ABSOLUTE: 0 K/CU MM
EOSINOPHILS RELATIVE PERCENT: 0 % (ref 0–3)
GFR AFRICAN AMERICAN: >60 ML/MIN/1.73M2
GFR AFRICAN AMERICAN: >60 ML/MIN/1.73M2
GFR NON-AFRICAN AMERICAN: >60 ML/MIN/1.73M2
GFR NON-AFRICAN AMERICAN: >60 ML/MIN/1.73M2
GLUCOSE BLD-MCNC: 148 MG/DL (ref 70–99)
GLUCOSE BLD-MCNC: 89 MG/DL (ref 70–99)
HCT VFR BLD CALC: 54.2 % (ref 42–52)
HEMOGLOBIN: 18.4 GM/DL (ref 13.5–18)
IMMATURE NEUTROPHIL %: 0.5 % (ref 0–0.43)
LACTATE: 2.9 MMOL/L (ref 0.4–2)
LACTATE: 4.5 MMOL/L (ref 0.4–2)
LIPASE: 14 IU/L (ref 13–60)
LYMPHOCYTES ABSOLUTE: 1.6 K/CU MM
LYMPHOCYTES RELATIVE PERCENT: 8.2 % (ref 24–44)
MAGNESIUM: 2.2 MG/DL (ref 1.8–2.4)
MCH RBC QN AUTO: 31.1 PG (ref 27–31)
MCHC RBC AUTO-ENTMCNC: 33.9 % (ref 32–36)
MCV RBC AUTO: 91.6 FL (ref 78–100)
MONOCYTES ABSOLUTE: 1.4 K/CU MM
MONOCYTES RELATIVE PERCENT: 6.9 % (ref 0–4)
NUCLEATED RBC %: 0 %
PDW BLD-RTO: 13.4 % (ref 11.7–14.9)
PLATELET # BLD: 397 K/CU MM (ref 140–440)
PMV BLD AUTO: 9.9 FL (ref 7.5–11.1)
POTASSIUM SERPL-SCNC: 3.2 MMOL/L (ref 3.5–5.1)
POTASSIUM SERPL-SCNC: 3.4 MMOL/L (ref 3.5–5.1)
RBC # BLD: 5.92 M/CU MM (ref 4.6–6.2)
SEGMENTED NEUTROPHILS ABSOLUTE COUNT: 16.6 K/CU MM
SEGMENTED NEUTROPHILS RELATIVE PERCENT: 84.2 % (ref 36–66)
SODIUM BLD-SCNC: 139 MMOL/L (ref 135–145)
SODIUM BLD-SCNC: 142 MMOL/L (ref 135–145)
TOTAL IMMATURE NEUTOROPHIL: 0.1 K/CU MM
TOTAL NUCLEATED RBC: 0 K/CU MM
TOTAL PROTEIN: 8.3 GM/DL (ref 6.4–8.2)
WBC # BLD: 19.8 K/CU MM (ref 4–10.5)

## 2021-05-11 PROCEDURE — 80048 BASIC METABOLIC PNL TOTAL CA: CPT

## 2021-05-11 PROCEDURE — 36415 COLL VENOUS BLD VENIPUNCTURE: CPT

## 2021-05-11 PROCEDURE — 6370000000 HC RX 637 (ALT 250 FOR IP): Performed by: PHYSICIAN ASSISTANT

## 2021-05-11 PROCEDURE — 96361 HYDRATE IV INFUSION ADD-ON: CPT

## 2021-05-11 PROCEDURE — 96374 THER/PROPH/DIAG INJ IV PUSH: CPT

## 2021-05-11 PROCEDURE — 6360000002 HC RX W HCPCS: Performed by: PHYSICIAN ASSISTANT

## 2021-05-11 PROCEDURE — C9113 INJ PANTOPRAZOLE SODIUM, VIA: HCPCS | Performed by: PHYSICIAN ASSISTANT

## 2021-05-11 PROCEDURE — 83735 ASSAY OF MAGNESIUM: CPT

## 2021-05-11 PROCEDURE — 83605 ASSAY OF LACTIC ACID: CPT

## 2021-05-11 PROCEDURE — 83690 ASSAY OF LIPASE: CPT

## 2021-05-11 PROCEDURE — 85025 COMPLETE CBC W/AUTO DIFF WBC: CPT

## 2021-05-11 PROCEDURE — 80053 COMPREHEN METABOLIC PANEL: CPT

## 2021-05-11 PROCEDURE — 96375 TX/PRO/DX INJ NEW DRUG ADDON: CPT

## 2021-05-11 PROCEDURE — 99285 EMERGENCY DEPT VISIT HI MDM: CPT

## 2021-05-11 PROCEDURE — 2580000003 HC RX 258: Performed by: PHYSICIAN ASSISTANT

## 2021-05-11 RX ORDER — ONDANSETRON 4 MG/1
4 TABLET, ORALLY DISINTEGRATING ORAL EVERY 8 HOURS PRN
Qty: 10 TABLET | Refills: 0 | Status: SHIPPED | OUTPATIENT
Start: 2021-05-11 | End: 2021-07-30

## 2021-05-11 RX ORDER — DICYCLOMINE HCL 20 MG
20 TABLET ORAL ONCE
Status: COMPLETED | OUTPATIENT
Start: 2021-05-11 | End: 2021-05-11

## 2021-05-11 RX ORDER — POTASSIUM CHLORIDE 750 MG/1
40 TABLET, FILM COATED, EXTENDED RELEASE ORAL ONCE
Status: COMPLETED | OUTPATIENT
Start: 2021-05-11 | End: 2021-05-11

## 2021-05-11 RX ORDER — 0.9 % SODIUM CHLORIDE 0.9 %
1000 INTRAVENOUS SOLUTION INTRAVENOUS ONCE
Status: COMPLETED | OUTPATIENT
Start: 2021-05-11 | End: 2021-05-11

## 2021-05-11 RX ORDER — METOCLOPRAMIDE HYDROCHLORIDE 5 MG/ML
10 INJECTION INTRAMUSCULAR; INTRAVENOUS ONCE
Status: COMPLETED | OUTPATIENT
Start: 2021-05-11 | End: 2021-05-11

## 2021-05-11 RX ORDER — PANTOPRAZOLE SODIUM 40 MG/10ML
40 INJECTION, POWDER, LYOPHILIZED, FOR SOLUTION INTRAVENOUS ONCE
Status: COMPLETED | OUTPATIENT
Start: 2021-05-11 | End: 2021-05-11

## 2021-05-11 RX ORDER — FAMOTIDINE 20 MG/1
20 TABLET, FILM COATED ORAL 2 TIMES DAILY
Qty: 20 TABLET | Refills: 0 | Status: SHIPPED | OUTPATIENT
Start: 2021-05-11 | End: 2021-07-30

## 2021-05-11 RX ORDER — DIPHENHYDRAMINE HYDROCHLORIDE 50 MG/ML
50 INJECTION INTRAMUSCULAR; INTRAVENOUS ONCE
Status: COMPLETED | OUTPATIENT
Start: 2021-05-11 | End: 2021-05-11

## 2021-05-11 RX ORDER — MAGNESIUM HYDROXIDE/ALUMINUM HYDROXICE/SIMETHICONE 120; 1200; 1200 MG/30ML; MG/30ML; MG/30ML
30 SUSPENSION ORAL ONCE
Status: COMPLETED | OUTPATIENT
Start: 2021-05-11 | End: 2021-05-11

## 2021-05-11 RX ADMIN — DICYCLOMINE HYDROCHLORIDE 20 MG: 20 TABLET ORAL at 19:56

## 2021-05-11 RX ADMIN — PANTOPRAZOLE SODIUM 40 MG: 40 INJECTION, POWDER, FOR SOLUTION INTRAVENOUS at 19:50

## 2021-05-11 RX ADMIN — METOCLOPRAMIDE 10 MG: 5 INJECTION, SOLUTION INTRAMUSCULAR; INTRAVENOUS at 18:27

## 2021-05-11 RX ADMIN — SODIUM CHLORIDE 1000 ML: 9 INJECTION, SOLUTION INTRAVENOUS at 18:27

## 2021-05-11 RX ADMIN — SODIUM CHLORIDE 1000 ML: 9 INJECTION, SOLUTION INTRAVENOUS at 20:00

## 2021-05-11 RX ADMIN — ALUMINUM HYDROXIDE, MAGNESIUM HYDROXIDE, AND SIMETHICONE 30 ML: 200; 200; 20 SUSPENSION ORAL at 19:56

## 2021-05-11 RX ADMIN — POTASSIUM CHLORIDE 40 MEQ: 750 TABLET, FILM COATED, EXTENDED RELEASE ORAL at 23:27

## 2021-05-11 RX ADMIN — DIPHENHYDRAMINE HYDROCHLORIDE 50 MG: 50 INJECTION, SOLUTION INTRAMUSCULAR; INTRAVENOUS at 18:27

## 2021-05-11 ASSESSMENT — PAIN SCALES - GENERAL: PAINLEVEL_OUTOF10: 8

## 2021-05-11 NOTE — CARE COORDINATION
Patient identified as potential readmission. Last admission 4/12 for 20 hours for abdominal pain. Patient here today for abdominal pain and vomiting. Patient provided with IV fluids, and medications for symptom management. Patient to have repeat lab work drawn after IV fluid completion. 4721 Patient disposition pending completion of lab redraw.

## 2021-05-11 NOTE — ED PROVIDER NOTES
EMERGENCY DEPARTMENT ENCOUNTER      PCP: No primary care provider on file. CHIEF COMPLAINT    Chief Complaint   Patient presents with    Abdominal Pain    Emesis       This patient was not evaluated by the attending physician. I have independently evaluated this patient. My supervising physician was available for consultation. HPI    Deb Styles is a 47 y.o. male who presents with abdominal pain, nausea, and vomiting. Onset was approximately 18 hours ago. Location abdominal pain is epigastric and left upper quadrant. Duration of pain has been constant since onset. Characteristic of pain is described as \"knotted up. \"  Aggravating factors trying to eat or drink anything. Alleviating factor is warm bath/shower helps with symptoms. Patient has not tried medication for symptoms. Patient reports multiple episodes of nonbloody emesis, but reports bilious emesis and some dark flecks in emesis. Pain does not radiate. Severity of pain is rated at 8 out of 10. Patient reports he is the same kind of symptoms he had last time he had to come to the ER and be admitted. Patient reports associated diarrhea. Patient does report that he still uses marijuana frequently. Patient denies fever, chills, chest pain, urinary symptoms, constipation, melena, hematochezia, hematemesis, shortness of breath, cough. Patient denies alcohol use. REVIEW OF SYSTEMS    Constitutional:  Denies fever, chills  HENT:  Denies sore throat or ear pain   Cardiovascular:  Denies chest pain, palpitations or swelling   Respiratory:  Denies cough or shortness of breath   GI:  See HPI above  : No hematuria, urinary urgency, urinary frequency, dysuria. Musculoskeletal:  Denies back pain or groin pain or masses. No pain or swelling of extremities.   Skin:  Denies rash  Neurologic:  Denies headache, focal weakness or sensory changes   Endocrine:  Denies polyuria or polydypsia   Lymphatic:  Denies swollen glands     All other review of systems are negative  See HPI and nursing notes for additional information     PAST MEDICAL & SURGICAL HISTORY    History reviewed. No pertinent past medical history. History reviewed. No pertinent surgical history. CURRENT MEDICATIONS    Current Outpatient Rx   Medication Sig Dispense Refill    ondansetron (ZOFRAN ODT) 4 MG disintegrating tablet Take 1 tablet by mouth every 8 hours as needed for Nausea or Vomiting 10 tablet 0    famotidine (PEPCID) 20 MG tablet Take 1 tablet by mouth 2 times daily 20 tablet 0       ALLERGIES    No Known Allergies    SOCIAL AND FAMILY HISTORY    Social History     Socioeconomic History    Marital status:      Spouse name: None    Number of children: None    Years of education: None    Highest education level: None   Occupational History    None   Social Needs    Financial resource strain: None    Food insecurity     Worry: None     Inability: None    Transportation needs     Medical: None     Non-medical: None   Tobacco Use    Smoking status: Current Every Day Smoker   Substance and Sexual Activity    Alcohol use: No    Drug use: No    Sexual activity: Never   Lifestyle    Physical activity     Days per week: None     Minutes per session: None    Stress: None   Relationships    Social connections     Talks on phone: None     Gets together: None     Attends Zoroastrian service: None     Active member of club or organization: None     Attends meetings of clubs or organizations: None     Relationship status: None    Intimate partner violence     Fear of current or ex partner: None     Emotionally abused: None     Physically abused: None     Forced sexual activity: None   Other Topics Concern    None   Social History Narrative    None     History reviewed. No pertinent family history.     PHYSICAL EXAM    VITAL SIGNS: BP (!) 106/55   Pulse 92   Temp 98.5 °F (36.9 °C) (Oral)   Resp 16   Ht 5' 6\" (1.676 m)   Wt 140 lb (63.5 kg)   SpO2 100%   BMI Non-intractable vomiting with nausea, unspecified vomiting type    2. Hypokalemia    3. Abdominal pain, unspecified abdominal location    4. Elevated lactic acid level        Disposition referral (if applicable):  [facility]  [address] [facility]  [phone]  Call today  Recheck in 1-2 days    [facility] Emergency Department  [address] [id number]  [phone]  Go to   Return to ED if symptoms worsen or new symptoms    Major Duverney, MD  [address] [place], [place] [address] [phone]      Establish primary care physician      Disposition medications (if applicable):  Discharge Medication List as of 5/11/2021 11:28 PM      START taking these medications    Details   famotidine (PEPCID) 20 MG tablet Take 1 tablet by mouth 2 times daily, Disp-20 tablet, R-0Normal               Comment: Please note this report has been produced using speech recognition software and may contain errors related to that system including errors in grammar, punctuation, and spelling, as well as words and phrases that may be inappropriate. If there are any questions or concerns please feel free to contact the dictating provider for clarification.         Eben Giraldo PA-C  05/12/21 1535

## 2021-07-30 ENCOUNTER — HOSPITAL ENCOUNTER (EMERGENCY)
Age: 41
Discharge: HOME | End: 2021-07-30
Payer: MEDICARE

## 2021-07-30 ENCOUNTER — APPOINTMENT (OUTPATIENT)
Dept: CT IMAGING | Age: 41
End: 2021-07-30
Payer: MEDICARE

## 2021-07-30 VITALS
SYSTOLIC BLOOD PRESSURE: 114 MMHG | WEIGHT: 135 LBS | HEIGHT: 66 IN | TEMPERATURE: 97.7 F | HEART RATE: 75 BPM | OXYGEN SATURATION: 96 % | DIASTOLIC BLOOD PRESSURE: 87 MMHG | RESPIRATION RATE: 18 BRPM | BODY MASS INDEX: 21.69 KG/M2

## 2021-07-30 DIAGNOSIS — I70.0 AORTIC ATHEROSCLEROSIS (HCC): ICD-10-CM

## 2021-07-30 DIAGNOSIS — R11.2 NAUSEA VOMITING AND DIARRHEA: Primary | ICD-10-CM

## 2021-07-30 DIAGNOSIS — R19.7 NAUSEA VOMITING AND DIARRHEA: Primary | ICD-10-CM

## 2021-07-30 DIAGNOSIS — R10.9 ABDOMINAL PAIN, UNSPECIFIED ABDOMINAL LOCATION: ICD-10-CM

## 2021-07-30 DIAGNOSIS — K20.90 ESOPHAGITIS: ICD-10-CM

## 2021-07-30 LAB
ALBUMIN SERPL-MCNC: 5.5 GM/DL (ref 3.4–5)
ALP BLD-CCNC: 129 IU/L (ref 40–129)
ALT SERPL-CCNC: 35 U/L (ref 10–40)
ANION GAP SERPL CALCULATED.3IONS-SCNC: 21 MMOL/L (ref 4–16)
AST SERPL-CCNC: 31 IU/L (ref 15–37)
BASOPHILS ABSOLUTE: 0 K/CU MM
BASOPHILS RELATIVE PERCENT: 0.1 % (ref 0–1)
BILIRUB SERPL-MCNC: 0.2 MG/DL (ref 0–1)
BUN BLDV-MCNC: 10 MG/DL (ref 6–23)
CALCIUM SERPL-MCNC: 10 MG/DL (ref 8.3–10.6)
CHLORIDE BLD-SCNC: 97 MMOL/L (ref 99–110)
CO2: 28 MMOL/L (ref 21–32)
CREAT SERPL-MCNC: 1 MG/DL (ref 0.9–1.3)
DIFFERENTIAL TYPE: ABNORMAL
EOSINOPHILS ABSOLUTE: 0 K/CU MM
EOSINOPHILS RELATIVE PERCENT: 0 % (ref 0–3)
GFR AFRICAN AMERICAN: >60 ML/MIN/1.73M2
GFR NON-AFRICAN AMERICAN: >60 ML/MIN/1.73M2
GLUCOSE BLD-MCNC: 128 MG/DL (ref 70–99)
HCT VFR BLD CALC: 52.2 % (ref 42–52)
HEMOGLOBIN: 17.5 GM/DL (ref 13.5–18)
IMMATURE NEUTROPHIL %: 0.5 % (ref 0–0.43)
LIPASE: 11 IU/L (ref 13–60)
LYMPHOCYTES ABSOLUTE: 1.3 K/CU MM
LYMPHOCYTES RELATIVE PERCENT: 6.4 % (ref 24–44)
MCH RBC QN AUTO: 30.7 PG (ref 27–31)
MCHC RBC AUTO-ENTMCNC: 33.5 % (ref 32–36)
MCV RBC AUTO: 91.6 FL (ref 78–100)
MONOCYTES ABSOLUTE: 1.1 K/CU MM
MONOCYTES RELATIVE PERCENT: 5.2 % (ref 0–4)
NUCLEATED RBC %: 0 %
PDW BLD-RTO: 14.2 % (ref 11.7–14.9)
PLATELET # BLD: 347 K/CU MM (ref 140–440)
PMV BLD AUTO: 9.5 FL (ref 7.5–11.1)
POTASSIUM SERPL-SCNC: 3.7 MMOL/L (ref 3.5–5.1)
RBC # BLD: 5.7 M/CU MM (ref 4.6–6.2)
SEGMENTED NEUTROPHILS ABSOLUTE COUNT: 17.7 K/CU MM
SEGMENTED NEUTROPHILS RELATIVE PERCENT: 87.8 % (ref 36–66)
SODIUM BLD-SCNC: 146 MMOL/L (ref 135–145)
TOTAL IMMATURE NEUTOROPHIL: 0.11 K/CU MM
TOTAL NUCLEATED RBC: 0 K/CU MM
TOTAL PROTEIN: 8.3 GM/DL (ref 6.4–8.2)
WBC # BLD: 20.2 K/CU MM (ref 4–10.5)

## 2021-07-30 PROCEDURE — 74177 CT ABD & PELVIS W/CONTRAST: CPT

## 2021-07-30 PROCEDURE — 83690 ASSAY OF LIPASE: CPT

## 2021-07-30 PROCEDURE — 85025 COMPLETE CBC W/AUTO DIFF WBC: CPT

## 2021-07-30 PROCEDURE — 6360000004 HC RX CONTRAST MEDICATION: Performed by: PHYSICIAN ASSISTANT

## 2021-07-30 PROCEDURE — 6360000002 HC RX W HCPCS: Performed by: PHYSICIAN ASSISTANT

## 2021-07-30 PROCEDURE — 99283 EMERGENCY DEPT VISIT LOW MDM: CPT

## 2021-07-30 PROCEDURE — 2580000003 HC RX 258: Performed by: PHYSICIAN ASSISTANT

## 2021-07-30 PROCEDURE — 83605 ASSAY OF LACTIC ACID: CPT

## 2021-07-30 PROCEDURE — 6370000000 HC RX 637 (ALT 250 FOR IP): Performed by: PHYSICIAN ASSISTANT

## 2021-07-30 PROCEDURE — 80053 COMPREHEN METABOLIC PANEL: CPT

## 2021-07-30 PROCEDURE — 96374 THER/PROPH/DIAG INJ IV PUSH: CPT

## 2021-07-30 RX ORDER — FAMOTIDINE 20 MG/1
20 TABLET, FILM COATED ORAL 2 TIMES DAILY
Qty: 30 TABLET | Refills: 0 | Status: SHIPPED | OUTPATIENT
Start: 2021-07-30

## 2021-07-30 RX ORDER — 0.9 % SODIUM CHLORIDE 0.9 %
1000 INTRAVENOUS SOLUTION INTRAVENOUS ONCE
Status: COMPLETED | OUTPATIENT
Start: 2021-07-30 | End: 2021-07-30

## 2021-07-30 RX ORDER — ONDANSETRON 2 MG/ML
4 INJECTION INTRAMUSCULAR; INTRAVENOUS ONCE
Status: COMPLETED | OUTPATIENT
Start: 2021-07-30 | End: 2021-07-30

## 2021-07-30 RX ORDER — DICYCLOMINE HCL 20 MG
20 TABLET ORAL ONCE
Status: COMPLETED | OUTPATIENT
Start: 2021-07-30 | End: 2021-07-30

## 2021-07-30 RX ORDER — ONDANSETRON 4 MG/1
4 TABLET, ORALLY DISINTEGRATING ORAL EVERY 8 HOURS PRN
Qty: 15 TABLET | Refills: 0 | Status: SHIPPED | OUTPATIENT
Start: 2021-07-30

## 2021-07-30 RX ORDER — FAMOTIDINE 20 MG/1
20 TABLET, FILM COATED ORAL ONCE
Status: COMPLETED | OUTPATIENT
Start: 2021-07-30 | End: 2021-07-30

## 2021-07-30 RX ORDER — DICYCLOMINE HYDROCHLORIDE 10 MG/1
20 CAPSULE ORAL
Qty: 30 CAPSULE | Refills: 0 | Status: SHIPPED | OUTPATIENT
Start: 2021-07-30

## 2021-07-30 RX ADMIN — ONDANSETRON 4 MG: 2 INJECTION INTRAMUSCULAR; INTRAVENOUS at 17:20

## 2021-07-30 RX ADMIN — FAMOTIDINE 20 MG: 20 TABLET ORAL at 19:09

## 2021-07-30 RX ADMIN — SODIUM CHLORIDE 1000 ML: 9 INJECTION, SOLUTION INTRAVENOUS at 17:20

## 2021-07-30 RX ADMIN — DICYCLOMINE HYDROCHLORIDE 20 MG: 20 TABLET ORAL at 19:08

## 2021-07-30 RX ADMIN — IOPAMIDOL 75 ML: 755 INJECTION, SOLUTION INTRAVENOUS at 18:17

## 2021-07-30 ASSESSMENT — PAIN DESCRIPTION - LOCATION: LOCATION: ABDOMEN

## 2021-07-30 ASSESSMENT — PAIN DESCRIPTION - PAIN TYPE: TYPE: ACUTE PAIN

## 2021-07-30 ASSESSMENT — PAIN SCALES - GENERAL: PAINLEVEL_OUTOF10: 9

## 2021-07-30 ASSESSMENT — PAIN DESCRIPTION - ONSET: ONSET: ON-GOING

## 2021-07-30 ASSESSMENT — PAIN DESCRIPTION - FREQUENCY: FREQUENCY: CONTINUOUS

## 2021-07-30 ASSESSMENT — PAIN DESCRIPTION - ORIENTATION: ORIENTATION: LEFT;UPPER

## 2021-07-30 NOTE — ED PROVIDER NOTES
As the Remote Tele physician-in-triage, I performed a medical screening history and physical exam on this patient remotely via the . Daron Carranza is a 47 y.o. male with ab pain, NV, chronic in nature. PHYSICAL EXAM  LMP 01/09/2020     On exam, the patient appears in no acute distress. Speech is clear. Breathing is unlabored. Comment: Please note this report has been produced using speech recognition software and may contain errors related to that system including errors in grammar, punctuation, and spelling, as well as words and phrases that may be inappropriate. If there are any questions or concerns please feel free to contact the dictating provider for clarification.         Rin Leal MD  07/30/21 5822

## 2021-07-30 NOTE — ED PROVIDER NOTES
EMERGENCY DEPARTMENT ENCOUNTER      PCP: No primary care provider on file. CHIEF COMPLAINT    Chief Complaint   Patient presents with    Abdominal Pain     LUQ x20 hours     Emesis       This patient was not evaluated by the attending physician. I have independently evaluated this patient. HPI    Rober Diane is a 47 y.o. male who presents with left-sided abdominal pain. Onset yesterday. Patient has had associated nausea vomiting and diarrhea. Patient denies chest pain, shortness of breath, urinary symptoms. Patient denies any fever. Patient states he has similar symptoms every few weeks. Patient does smoke marijuana. Patient denies history of abdominal surgeries. Patient denies attacks with similar symptoms or eating anything unusual.      REVIEW OF SYSTEMS    Constitutional:  Denies fever  HENT:  Denies ear pain   Cardiovascular:  Denies chest pain  Respiratory:  Denies cough or shortness of breath   GI:  See HPI above  : No hematuria or dysuria. Musculoskeletal: No pain or swelling of extremities. Skin:  Denies rash  Neurologic:  Denies focal weakness or sensory changes   Lymphatic:  Denies swollen glands     All other review of systems are negative  See HPI and nursing notes for additional information     PAST MEDICAL & SURGICAL HISTORY    No past medical history on file. No past surgical history on file.     CURRENT MEDICATIONS    Current Outpatient Rx   Medication Sig Dispense Refill    famotidine (PEPCID) 20 MG tablet Take 1 tablet by mouth 2 times daily 30 tablet 0    ondansetron (ZOFRAN ODT) 4 MG disintegrating tablet Take 1 tablet by mouth every 8 hours as needed for Nausea or Vomiting 15 tablet 0    dicyclomine (BENTYL) 10 MG capsule Take 2 capsules by mouth 4 times daily (before meals and nightly) 30 capsule 0       ALLERGIES    No Known Allergies    SOCIAL AND FAMILY HISTORY    Social History     Socioeconomic History    Marital status:      Spouse name: Not on file  Number of children: Not on file    Years of education: Not on file    Highest education level: Not on file   Occupational History    Not on file   Tobacco Use    Smoking status: Current Every Day Smoker   Substance and Sexual Activity    Alcohol use: No    Drug use: No    Sexual activity: Never   Other Topics Concern    Not on file   Social History Narrative    Not on file     Social Determinants of Health     Financial Resource Strain:     Difficulty of Paying Living Expenses:    Food Insecurity:     Worried About Running Out of Food in the Last Year:     [address] in the Last Year:    Transportation Needs:     Lack of Transportation (Medical):  Lack of Transportation (Non-Medical):    Physical Activity:     Days of Exercise per Week:     Minutes of Exercise per Session:    Stress:     Feeling of Stress :    Social Connections:     Frequency of Communication with Friends and Family:     Frequency of Social Gatherings with Friends and Family:     Attends Adventism Services:     Active Member of Clubs or Organizations:     Attends Club or Organization Meetings:     Marital Status:    Intimate Partner Violence:     Fear of Current or Ex-Partner:     Emotionally Abused:     Physically Abused:     Sexually Abused:      No family history on file. PHYSICAL EXAM    VITAL SIGNS: /87   Pulse 75   Temp 97.7 °F (36.5 °C) (Oral)   Resp 18   Ht 5' 6\" (1.676 m)   Wt 135 lb (61.2 kg)   SpO2 96%   BMI 21.79 kg/m²   Constitutional:  Well developed, well nourished  Eyes:  Sclera nonicteric, conjunctiva moist  HENT:  Atraumatic. PERRL.    Neck/Lymphatics: supple, no JVD, no swollen nodes  Respiratory:  No retractions, no accessory muscle use, normal breath sounds   Cardiovascular:  normal rate, normal rhythm  GI:     No gross discoloration.       -no Nicholas's sign (periumbilical ecchymosis)       -no Grey-Acosta's sign (flank ecchymosis)    Bowel sounds present, no audible bruits. Soft,  no guarding,   + moderate left sided abdominal tenderness, no rebound, no palpable pulsatile masses  Back:   No CVA tenderness to percussion.   Musculoskeletal:  No edema, no deformity  Integument: No rash, dry skin  Neurologic:  Alert & oriented, normal speech  Psychiatric: Cooperative, pleasant affect       LABS:  Results for orders placed or performed during the hospital encounter of 07/30/21   Comprehensive Metabolic Panel   Result Value Ref Range    Sodium 146 (H) 135 - 145 MMOL/L    Potassium 3.7 3.5 - 5.1 MMOL/L    Chloride 97 (L) 99 - 110 mMol/L    CO2 28 21 - 32 MMOL/L    BUN 10 6 - 23 MG/DL    CREATININE 1.0 0.9 - 1.3 MG/DL    Glucose 128 (H) 70 - 99 MG/DL    Calcium 10.0 8.3 - 10.6 MG/DL    Albumin 5.5 (H) 3.4 - 5.0 GM/DL    Total Protein 8.3 (H) 6.4 - 8.2 GM/DL    Total Bilirubin 0.2 0.0 - 1.0 MG/DL    ALT 35 10 - 40 U/L    AST 31 15 - 37 IU/L    Alkaline Phosphatase 129 40 - 129 IU/L    GFR Non-African American >60 >60 mL/min/1.73m2    GFR African American >60 >60 mL/min/1.73m2    Anion Gap 21 (H) 4 - 16   CBC Auto Differential   Result Value Ref Range    WBC 20.2 (H) 4.0 - 10.5 K/CU MM    RBC 5.70 4.6 - 6.2 M/CU MM    Hemoglobin 17.5 13.5 - 18.0 GM/DL    Hematocrit 52.2 (H) 42 - 52 %    MCV 91.6 78 - 100 FL    MCH 30.7 27 - 31 PG    MCHC 33.5 32.0 - 36.0 %    RDW 14.2 11.7 - 14.9 %    Platelets 604 077 - 031 K/CU MM    MPV 9.5 7.5 - 11.1 FL    Differential Type AUTOMATED DIFFERENTIAL     Segs Relative 87.8 (H) 36 - 66 %    Lymphocytes % 6.4 (L) 24 - 44 %    Monocytes % 5.2 (H) 0 - 4 %    Eosinophils % 0.0 0 - 3 %    Basophils % 0.1 0 - 1 %    Segs Absolute 17.7 K/CU MM    Lymphocytes Absolute 1.3 K/CU MM    Monocytes Absolute 1.1 K/CU MM    Eosinophils Absolute 0.0 K/CU MM    Basophils Absolute 0.0 K/CU MM    Nucleated RBC % 0.0 %    Total Nucleated RBC 0.0 K/CU MM    Total Immature Neutrophil 0.11 K/CU MM    Immature Neutrophil % 0.5 (H) 0 - 0.43 %   Lipase   Result Value Ref Range Lipase 11 (L) 13 - 60 IU/L           RADIOLOGY/PROCEDURES    CT ABDOMEN PELVIS W IV CONTRAST Additional Contrast? None   Final Result   Suggestion of mild mucosal hyperenhancement and mild mural thickening of   small bowel in the mid to left upper abdomen, which raise the possibility of   mild enteritis. Mild mural thickening of the distal esophagus, suggesting esophagitis. Mild to moderate atherosclerotic plaque within the distal abdominal aorta and   the proximal iliac arteries, advanced for the patient's age. Any risk   factors for atherosclerosis should be managed aggressively. ED COURSE & MEDICAL DECISION MAKING      Patient presents as above. Patient provide IV fluids, Zofran, Pepcid and Bentyl. CBC shows elevated white blood cell count of 20.2. Lipase is 11. CMP shows anion gap of 21, sodium 146 otherwise grossly within normal limits. Due to significant elevated white blood cell count CT imaging is ordered for further evaluation. CT abdomen pelvis with IV contrast shows suggestion of mild mucosal hyperenhancement and mild mural thickening of small bowel in mid to left upper abdomen which raise the possibility of mild enteritis, mild mural thickening of the distal esophagus suggesting esophagitis, mild to moderate atherosclerotic plaque with the distal abdominal aorta and proximal iliac arteries. I discussed labs and imaging with patient today. Patient states he is feeling much better after medications. Patient tolerates p.o. fluids. Patient will be provided prescription for Pepcid, Zofran and Bentyl. I recommend that patient stop smoking marijuana. Recommend follow-up with primary care provider in 2 days for recheck. I have also provide information for local gastroenterology for further evaluation. Clinical  IMPRESSION    1. Nausea vomiting and diarrhea    2. Abdominal pain, unspecified abdominal location    3.  Aortic atherosclerosis (HCC)    4. Esophagitis I discussed with patient the importance return the emergency department immediately if new or worsening symptoms develop. Comment: Please note this report has been produced using speech recognition software and may contain errors related to that system including errors in grammar, punctuation, and spelling, as well as words and phrases that may be inappropriate. If there are any questions or concerns please feel free to contact the dictating provider for clarification.      Megha Azul PA-C  07/30/21 8326

## 2021-11-22 ENCOUNTER — APPOINTMENT (OUTPATIENT)
Dept: GENERAL RADIOLOGY | Age: 41
End: 2021-11-22
Payer: COMMERCIAL

## 2021-11-22 ENCOUNTER — HOSPITAL ENCOUNTER (EMERGENCY)
Age: 41
Discharge: HOME | End: 2021-11-22
Payer: COMMERCIAL

## 2021-11-22 VITALS
TEMPERATURE: 98 F | HEART RATE: 98 BPM | WEIGHT: 136 LBS | SYSTOLIC BLOOD PRESSURE: 135 MMHG | DIASTOLIC BLOOD PRESSURE: 85 MMHG | RESPIRATION RATE: 18 BRPM | OXYGEN SATURATION: 99 % | BODY MASS INDEX: 21.95 KG/M2

## 2021-11-22 DIAGNOSIS — M79.671 RIGHT FOOT PAIN: Primary | ICD-10-CM

## 2021-11-22 PROCEDURE — 73630 X-RAY EXAM OF FOOT: CPT

## 2021-11-22 PROCEDURE — 99285 EMERGENCY DEPT VISIT HI MDM: CPT

## 2021-11-22 PROCEDURE — 6370000000 HC RX 637 (ALT 250 FOR IP): Performed by: PHYSICIAN ASSISTANT

## 2021-11-22 RX ORDER — ACETAMINOPHEN 500 MG
500 TABLET ORAL ONCE
Status: COMPLETED | OUTPATIENT
Start: 2021-11-22 | End: 2021-11-22

## 2021-11-22 RX ORDER — IBUPROFEN 600 MG/1
600 TABLET ORAL ONCE
Status: COMPLETED | OUTPATIENT
Start: 2021-11-22 | End: 2021-11-22

## 2021-11-22 RX ORDER — NAPROXEN 500 MG/1
500 TABLET ORAL 2 TIMES DAILY
Qty: 60 TABLET | Refills: 0 | Status: SHIPPED | OUTPATIENT
Start: 2021-11-22

## 2021-11-22 RX ADMIN — IBUPROFEN 600 MG: 600 TABLET, FILM COATED ORAL at 11:32

## 2021-11-22 RX ADMIN — ACETAMINOPHEN 500 MG: 500 TABLET ORAL at 11:32

## 2021-11-22 ASSESSMENT — PAIN SCALES - GENERAL
PAINLEVEL_OUTOF10: 6
PAINLEVEL_OUTOF10: 5
PAINLEVEL_OUTOF10: 6

## 2021-11-22 ASSESSMENT — PAIN DESCRIPTION - LOCATION: LOCATION: FOOT

## 2021-11-22 ASSESSMENT — PAIN DESCRIPTION - ORIENTATION: ORIENTATION: RIGHT

## 2021-11-22 ASSESSMENT — PAIN DESCRIPTION - PAIN TYPE: TYPE: ACUTE PAIN

## 2021-11-22 ASSESSMENT — PAIN DESCRIPTION - DESCRIPTORS: DESCRIPTORS: SHARP

## 2021-11-22 NOTE — ED PROVIDER NOTES
EMERGENCY DEPARTMENT ENCOUNTER      PCP: No primary care provider on file. CHIEF COMPLAINT    Chief Complaint   Patient presents with    Foot Injury         This patient was not evaluated by the attending physician. I have independently evaluated this patient . HPI    Rosie Hubbard is a 46 y.o. male who presents to the emergency department today with a chief complaint of right calcaneal pain. Patient states that he injured his foot last night while trying to step out of his bed, his foot inverted and now having pain to the lateral aspect of the heel. Having difficulty bearing weight on his calcaneus but is able to walk on his toes. Has no other injuries no pain up into his ankle. No other significant trauma    REVIEW OF SYSTEMS    General: No fever or chills  Skin: No lacerations or puncture wounds  Musculoskeletal: No other joint pain    All other review of systems are negative  See HPI and nursing notes for additional information       [address]    History reviewed. No pertinent past medical history. History reviewed. No pertinent surgical history.     CURRENT MEDICATIONS    Current Outpatient Rx   Medication Sig Dispense Refill    naproxen (NAPROSYN) 500 MG tablet Take 1 tablet by mouth 2 times daily 60 tablet 0    famotidine (PEPCID) 20 MG tablet Take 1 tablet by mouth 2 times daily 30 tablet 0    ondansetron (ZOFRAN ODT) 4 MG disintegrating tablet Take 1 tablet by mouth every 8 hours as needed for Nausea or Vomiting 15 tablet 0    dicyclomine (BENTYL) 10 MG capsule Take 2 capsules by mouth 4 times daily (before meals and nightly) 30 capsule 0       ALLERGIES    No Known Allergies    SOCIAL & FAMILY HISTORY    Social History     Socioeconomic History    Marital status:      Spouse name: None    Number of children: None    Years of education: None    Highest education level: None   Occupational History    None   Tobacco Use    Smoking status: Current Every Day Smoker     Packs/day: 1.00    Smokeless tobacco: Never Used   Vaping Use    Vaping Use: Never used   Substance and Sexual Activity    Alcohol use: No    Drug use: No    Sexual activity: Never   Other Topics Concern    None   Social History Narrative    None     Social Determinants of Health     Financial Resource Strain:     Difficulty of Paying Living Expenses: Not on file   Food Insecurity:     Worried About Running Out of Food in the Last Year: Not on file    Farooq of Food in the Last Year: Not on file   Transportation Needs:     Lack of Transportation (Medical): Not on file    Lack of Transportation (Non-Medical): Not on file   Physical Activity:     Days of Exercise per Week: Not on file    Minutes of Exercise per Session: Not on file   Stress:     Feeling of Stress : Not on file   Social Connections:     Frequency of Communication with Friends and Family: Not on file    Frequency of Social Gatherings with Friends and Family: Not on file    Attends Bahai Services: Not on file    Active Member of [address] [place] [address] or Organizations: Not on file    Attends Club or Organization Meetings: Not on file    Marital Status: Not on file   Intimate Partner Violence:     Fear of Current or Ex-Partner: Not on file    Emotionally Abused: Not on file    Physically Abused: Not on file    Sexually Abused: Not on file   Housing Stability:     Unable to Pay for Housing in the Last Year: Not on file    Number of [place] in the Last Year: Not on file    Unstable Housing in the Last Year: Not on file     History reviewed. No pertinent family history.       PHYSICAL EXAM    VITAL SIGNS: /85   Pulse 98   Temp 98 °F (36.7 °C) (Oral)   Resp 18   Wt 136 lb (61.7 kg)   SpO2 99%   BMI 21.95 kg/m²   Constitutional:  Well developed, appears comfortable  HENT:  Atraumatic  Respiratory:  No retractions  Musculoskeletal:   Right Lower Extemity:  The Right foot demonstrates no obvious defect or deformity, there is mild bruising to the lateral aspect of the right calcaneus. There is  tenderness over this area. No palpable defects. Range of motion  - no obvious deficits. The ankle joint is stable. Achilles tendon intact    No swelling, discoloration, or tenderness to palpation of the proximal to distal lower leg bones, ankle & toes  Distal capillary refill, sensation, motor intact. Vascular:  DP pulse 2+, capillary refill intact. Integument:  No open wounds of the left ankle   Neurologic:  Awake alert, no slurred speech     RADIOLOGY   XR FOOT RIGHT (MIN 3 VIEWS)    Result Date: 11/22/2021  EXAMINATION: THREE XRAY VIEWS OF THE RIGHT FOOT 11/22/2021 11:06 am COMPARISON: None. HISTORY: ORDERING SYSTEM PROVIDED HISTORY: right foot pain TECHNOLOGIST PROVIDED HISTORY: Reason for exam:->right foot pain Reason for Exam: right foot pain FINDINGS: There is normal alignment of the right foot. No fracture or osseous destructive lesion. No significant degenerative changes or soft tissue swelling. 1. Unremarkable radiographs of the right foot. ED COURSE & MEDICAL DECISION MAKING      Patient was provided with a postop shoe and ace wrap  emergency department today. I provided a prescription for pain medication. Recommend ice and elevation as much as possible. .    To followup with orthopedist if symptoms do not improve over the next 5-7days. Patient agrees to return emergency department if symptoms worsen or any new symptoms develop. Vital signs and nursing notes reviewed during ED course. All pertinent Lab data and radiographic results reviewed with patient at bedside. The patient and/or the family were informed of the results of any tests/labs/imaging, the treatment plan, and time was allotted to answer questions. Clinical  IMPRESSION    1.  Right foot pain      Comment: Please note this report has been produced using speech recognition software and may contain errors related to that system including errors in grammar, punctuation, and spelling, as well as words and phrases that may be inappropriate. If there are any questions or concerns please feel free to contact the dictating provider for clarification.           Mario Gauthier, PA  11/22/21 5651

## 2021-11-22 NOTE — Clinical Note
Moises Garcia was seen and treated in our emergency department on 11/22/2021. He may return to work on 11/26/2021. If you have any questions or concerns, please don't hesitate to call.       Mario Joshi 411, PA

## 2021-11-22 NOTE — Clinical Note
Estelle Lloyd was seen and treated in our emergency department on 11/22/2021. He may return to work on 11/29/2021. If you have any questions or concerns, please don't hesitate to call.       EDUARDO Jordan

## 2022-04-15 ENCOUNTER — HOSPITAL ENCOUNTER (OUTPATIENT)
Dept: PSYCHIATRY | Age: 42
Setting detail: THERAPIES SERIES
Discharge: HOME | End: 2022-04-15
Payer: COMMERCIAL

## 2022-04-15 PROCEDURE — 80305 DRUG TEST PRSMV DIR OPT OBS: CPT

## 2022-04-15 PROCEDURE — 90791 PSYCH DIAGNOSTIC EVALUATION: CPT

## 2022-04-15 ASSESSMENT — ANXIETY QUESTIONNAIRES
4. TROUBLE RELAXING: 0
1. FEELING NERVOUS, ANXIOUS, OR ON EDGE: 0
2. NOT BEING ABLE TO STOP OR CONTROL WORRYING: 0
7. FEELING AFRAID AS IF SOMETHING AWFUL MIGHT HAPPEN: 0
5. BEING SO RESTLESS THAT IT IS HARD TO SIT STILL: 0
6. BECOMING EASILY ANNOYED OR IRRITABLE: 0
3. WORRYING TOO MUCH ABOUT DIFFERENT THINGS: 0
IF YOU CHECKED OFF ANY PROBLEMS ON THIS QUESTIONNAIRE, HOW DIFFICULT HAVE THESE PROBLEMS MADE IT FOR YOU TO DO YOUR WORK, TAKE CARE OF THINGS AT HOME, OR GET ALONG WITH OTHER PEOPLE: NOT DIFFICULT AT ALL
GAD7 TOTAL SCORE: 0

## 2022-04-15 ASSESSMENT — LIFESTYLE VARIABLES: HISTORY_ALCOHOL_USE: YES

## 2022-04-15 NOTE — PROGRESS NOTES
[facility]                Progress Note    [x] Eddie  [] Skye Kelley                    Patient Name: Calixto Romano   : 1980     Case # :  [id number]  Therapist: LUIS Church        Objective/Service/Time:  ASSESSMENT PART I    1-HOUR    S:  Client is a 47 YO WDM who lives al. Client reports being rewarded custody of his two daughters and was referred by the courts to complete an AOD assessment. Client reports being clean/sober for the past two years. O:  Client was cooperative and fully oriented with an intact thought process. Speech was normal and affect congruent. Reports no current hallucinations, delusions, or SI/HI. A:  Completed Intake Paperwork, Initial UDS, and received treatment recommendations. Client became upset after he was told that assessments normally take two sessions. This therapist was able to complete the assessment in one session and recommended Level I Outpatient treatment. Client became more agitated and stated, \"I only came here for an assessment to get custody of my daughters and now I need treatment! \" \"I don't need treatment and I'm not coming back! \" I explained to the client that these are our recommendations and that he would need to the court and/or his  about his next step. P:  A letter will go out to client's  once the UDS results are finalized.                   Tessie Kumar MA, [facility], , 3:14 PM

## 2022-04-15 NOTE — PROGRESS NOTES
[address] [place] [address] Urinalysis Laboratory Testing and Medical History Physician Order       Location: [x] [place] [] Fredo Hyde MD., [id number], Medical Director of [facility] Director orders for [address] [place] [address] clinical therapists to collect an urine sample from the below patient,  and medical order for placement in level of care documented on [facility] 157 scanned order. Client: Shelly Mckee   : 1980   Case# [id number]    Urine sample will be collected following the collections guidelines provided on Clinical Reference Laboratory [facility] [place]) custody form, and completion of the [facility] Non-Federal chain of custody drug screening form. During the course of treatment, randomly a urine sample will be collected, at a minimum of one time a month, more frequently as needed, as part of the clinical outpatient alcohol and drug treatment program at [address] [place] [address]. Medical care recommendation for clients experiencing/reporting  medical concerns, who do not have a family physician and willing to attend  medical care will be assisted in seeking medical care. Clinical providers will refer clients to local family physicians practices as part of the  clients treatment plan and assist the client in gaining access to an appointment. Release of information will be requested to support the  clients seeking medical care. Summary of Medical History  Prior to Admission medications    Medication Sig Start Date End Date Taking?  Authorizing Provider   naproxen (NAPROSYN) 500 MG tablet Take 1 tablet by mouth 2 times daily 21   EDUARDO Soriano   famotidine (PEPCID) 20 MG tablet Take 1 tablet by mouth 2 times daily 21   Lora Roberts PA-C   ondansetron (ZOFRAN ODT) 4 MG disintegrating tablet Take 1 tablet by mouth every 8 hours as needed for Nausea or Vomiting 21   Lora Roberts PA-C   dicyclomine (BENTYL) 10 MG capsule Take 2 capsules by mouth 4 times daily (before meals and nightly) 7/30/21   Gabino Quigley PA-C     History reviewed. No pertinent surgical history.   Past Medical History:   Diagnosis Date    [facility]     3 Years Ago  2018     Patient Active Problem List    Diagnosis Date Noted    Hypokalemia 04/12/2021       Dominique Alcaraz [facility]  5/30/9597/6:92 PM

## 2022-04-20 NOTE — PROGRESS NOTES
[facility]                     CLINICAL DIAGNOSIS SUMMARY    Location: [x] [place] [] [facility]                   Patient Name: Alanna Porter   : 1980     Case # :  [id number]  Therapist: LUIS Nguyen      1. Identifying information:  Alanna Porter / 1980           Client is a 45 YO WDM who lives al. Client reports being rewarded custody of his two daughters and was referred by the courts to complete an AOD assessment. Client reports being clean/sober for the past two years. 2.  Substance use history:  F10.20 Other and unspecified alcohol dependence/unspecified drinking behavior, F12.20 Cannabis dependence-unspecified use and F14.10 Nondependent cocaine abuse-unspecified use         First use of alcohol age [age]; drank 1x); No alcohol use until age 31; Reports drinking for 2 years every other day 4 shots; Stopped at age 42 (DUI); 40-present 2 shots every 2 weeks; Last use 24 1 Fireball (2 shots)    First use of marijuana age 17 through teens off/on 1 joint; 20's weekends 1 joint or a bowl; Age 24 stopped; Obtained THC-Medical card 10/2021; Last use age 24    First use of Cocaine age [age] (powder) nasal 1-2 lines rarely; Stopped at age 34; Last use 2019    3. Consequences of substance use: (personal, inter-personal, legal, occupational, medical, nutritional,       Leisure, spiritual, etc.)             Low Sober Support  Legal History (DUI and Drug possession Charges)    COPD            4. Co-existing problems;  (mental health, psychiatric, previous treatment programs, family       Problems, social, educational, etc.)    No MH/Psyc  No Prev. [place]  Low Sober Support         5.  Treatment needs, barriers to treatment, impact of disease on life:         Outpatient Services  Not following through with treatment recommendations  Legal Impact    Summary of Medical History:  Prior to Admission medications Medication Sig Start Date End Date Taking? Authorizing Provider   naproxen (NAPROSYN) 500 MG tablet Take 1 tablet by mouth 2 times daily 11/22/21   EDUARDO Mack   famotidine (PEPCID) 20 MG tablet Take 1 tablet by mouth 2 times daily 7/30/21   Tess Roberts PA-C   ondansetron (ZOFRAN ODT) 4 MG disintegrating tablet Take 1 tablet by mouth every 8 hours as needed for Nausea or Vomiting 7/30/21   Tess Roberts PA-C   dicyclomine (BENTYL) 10 MG capsule Take 2 capsules by mouth 4 times daily (before meals and nightly) 7/30/21   Brock Hernandez PA-C     History reviewed. No pertinent surgical history. Past Medical History:   Diagnosis Date    COPD (chronic obstructive pulmonary disease) ([facility]     3 Years Ago  2018     Patient Active Problem List    Diagnosis Date Noted    Hypokalemia 04/12/2021       6. Level of Care Determination:      1 Outpatient Services      7. Treatment available      __X_yes   _____no         8.   Name of program referred:    __X__[facility],    ____[facility],       _______other/identify     Electronically signed by Sabrina Serra [place] [address] on 8/99/6823 at 1:33 PM

## 2022-04-22 ENCOUNTER — APPOINTMENT (OUTPATIENT)
Dept: PSYCHIATRY | Age: 42
End: 2022-04-22
Payer: COMMERCIAL

## 2022-04-29 ENCOUNTER — APPOINTMENT (OUTPATIENT)
Dept: PSYCHIATRY | Age: 42
End: 2022-04-29
Payer: COMMERCIAL

## 2022-10-27 ENCOUNTER — HOSPITAL ENCOUNTER (OUTPATIENT)
Dept: PSYCHIATRY | Age: 42
Setting detail: THERAPIES SERIES
Discharge: HOME | End: 2022-10-27
Payer: MEDICARE

## 2022-10-27 PROCEDURE — 80305 DRUG TEST PRSMV DIR OPT OBS: CPT

## 2022-10-27 PROCEDURE — 90791 PSYCH DIAGNOSTIC EVALUATION: CPT

## 2022-10-27 ASSESSMENT — ANXIETY QUESTIONNAIRES
4. TROUBLE RELAXING: 0
3. WORRYING TOO MUCH ABOUT DIFFERENT THINGS: 1
7. FEELING AFRAID AS IF SOMETHING AWFUL MIGHT HAPPEN: 1
6. BECOMING EASILY ANNOYED OR IRRITABLE: 1
2. NOT BEING ABLE TO STOP OR CONTROL WORRYING: 1
IF YOU CHECKED OFF ANY PROBLEMS ON THIS QUESTIONNAIRE, HOW DIFFICULT HAVE THESE PROBLEMS MADE IT FOR YOU TO DO YOUR WORK, TAKE CARE OF THINGS AT HOME, OR GET ALONG WITH OTHER PEOPLE: NOT DIFFICULT AT ALL
1. FEELING NERVOUS, ANXIOUS, OR ON EDGE: 0
5. BEING SO RESTLESS THAT IT IS HARD TO SIT STILL: 0
GAD7 TOTAL SCORE: 4

## 2022-10-27 NOTE — PROGRESS NOTES
[facility]        Individual  Progress Note    Location: [x] [place] [] Adriano Culp                   Patient Name: Umairoctaviano Garcia   : 1980     Case # :  [id number]  Therapist: RON Wilcox        1 hour         S: Triny Johnson 38year old: two children: involved with Children Services and GAL: indicated children in custordy of biological mother. O: Denies any homicidal and suicidal ideation: denies any psychosis, oriented x 4           A: Collected urine drug screen, initiated psychosocial assessment, and other pertinent and vital documentation essential for client to engage services. Addictive Behavior, SSRS suicide screening, Addictive services, Spiritual screening, Health History, Provided client documentation for resources. Trauma history, Behavior Screening, Mental status, Alcohol screening and Drug screening. P: Plan to review urine drug screen, complete progress report, finalize remaining psychosocial assessment, complete treatment plan and establish adequate level of care and recommendations.            Electronically signed by RON Wilcox on  at 7:11 PM       Anupam Miranda LSW, RON

## 2022-10-27 NOTE — PROGRESS NOTES
(before meals and nightly) 7/30/21   Orlando Mallory PA-C     History reviewed. No pertinent surgical history.   Past Medical History:   Diagnosis Date    COPD (chronic obstructive pulmonary disease) [facility]     3 Years Ago  2018     Patient Active Problem List    Diagnosis Date Noted    Hypokalemia 04/12/2021       RON Pearl  95/02/4516/4:14 PM

## 2022-11-03 ENCOUNTER — HOSPITAL ENCOUNTER (OUTPATIENT)
Dept: PSYCHIATRY | Age: 42
Setting detail: THERAPIES SERIES
Discharge: HOME | End: 2022-11-03
Payer: MEDICARE

## 2022-11-03 PROCEDURE — 90834 PSYTX W PT 45 MINUTES: CPT

## 2022-11-03 NOTE — PROGRESS NOTES
[facility] TREATMENT PLAN           Location: [x] [place] [] [place]        Treatment plan: Initial          Strengths: employed            Weakness/Limitations: Children Services            Service/Frequency/Duration: Individual Session x 1 time weekly x 90 days or as needed,  Urinalysis one time monthly x 90 days or as needed and one time weekly x 90 days A.A /  NA meetings            Diagnosis:         F10.10 Alcohol Use Disorder    F12.10 Cannabis Use Disorder                               Level of Care:  Standard  Outpatient and Individual sessions         Problem: History of Substance use            Goal: Enhance personalized knowledge and insight associated with mood altering substances x 90 days     Objectives:        1) Remind 2 to 6  detrimental consequences in major life areas regarding substance  use in 90 days Evaluation Date: 2/3/2023 Code: C Continue TBD                               2) Identify 4 to 8 psychological or physiological benefits /  gratitudes due to remaining substance free in 90 days: Evaluation Date: 2/3/2023 Code: C Continue TBD                   3) Identify and explain 2 to 4 explanations about relapse triggers. Explain 2 to 4 things about relapse. Identify 2 to 4 differences and similarities between internal and eternal triggers associated with substance use in 90 days and Evaluation Date: 2/3/2023 Code:  Continue TBD                2.    Problem: Limited knowledge regarding disease concept and substance use. Goal:  Enhance knowledge and understanding regarding disease concept associated with substance use.          Objectives:           1) Complete 2 to 4 assignments regarding biography of substance use, include onset, frequency, tolerance and amounts  and  in 90 days:  Evaluation Date: 2/3/2023 Code: Code: C Continue TBD             2) Complete assignment on difference  between substance abuse, substance dependence and disease concept of substance use in 90 days Evaluation Date: 2/3/2023 Code: C Continue TBD                   3)  Utilize, if needed case management services provided by [facility] to enhance abstaining from substance use Evaluation Date: 2/3/2023  Code: C Continue TBD                  3.    Problem: Limited experience and life regarding Relapse x 90 days             Goal: Identify and address the core dynamics and dilemmas that are perpetuating consequences and exacerbate relapses and triggers and in 90 days          Objectives:           1)  Complete and review with therapist at least 2 or more periods of being sober in 90 days Evaluation Date: 2/3/2023 Code: C Continue TBD                    2) Enhance 4 to 8 healthy techniques and coping skills to empower a healthy  relapse prevention plan x 90 days  Evaluation Date: 2/3/2023 Code: C Continue TB                  3) Journal, discuss, monitor  3 to 5 physiological, psychological, biological and mental alterations and coping skills of being sober: x 90 days  Evaluation Date:  2/3/2023       Code: C Continue TBD                  Defer: Address legal stipulations and mental health                   Discharge Plan/Instructions: Demonstrate constructive motivation to successfully complete outpatient treatment, finalize stipulations for probation and legal system and develop healthy sobriety plan. [facility] 1980 has participated in the treatment plan development outlined above on 11/3/2022.              Cyrus Montelongo [facility]  54/0/9887/5:87 PM

## 2022-11-03 NOTE — PROGRESS NOTES
[facility]                     CLINICAL DIAGNOSIS SUMMARY    Location: [x] [place] [] Margaret [facility]                   Patient Name: Marianne Quintanilla   : 1980     Case # :  [id number]  [facility]      Identifying information:  Marianne Quintanilla / 1980            Shannan Lemon 46-year-old: two children: involved with Children Services and GAL: indicated children in custody of biological mother. 2.  Substance use history:        F10. 10 Alcohol Use Disorder  F12.10 Cannabis Use Disorder    Onset of snorting cocaine: indicated last snorted age 41: tried 3 or 4 times: used while consuming alcohol     Valid Marijuana card: Onset of smoking marijuana: age 27: last use gummies: improved his sleep: last two year concerned about shoulder injury: in age 23 and 29: daily smoker: age 38 to 52: occasional smoker several times weekly    Onset of consuming alcohol: age 31: 3 o 5 shots daily: no drinking since                3. Consequences of substance use: (personal, inter-personal, legal, occupational, medical, nutritional,       Leisure, spiritual, etc.)                    Involved with Children Services and GAL: indicated children in custody of biological mother. Medical: diagnosed COPD           4. Co-existing problems;  (mental health, psychiatric, previous treatment programs, family       Problems, social, educational, etc.)           Relationship conflict associated with contact with children         5. Treatment needs, barriers to treatment, impact of disease on life:          No barriers         Summary of Medical History:  Prior to Admission medications    Medication Sig Start Date End Date Taking?  Authorizing Provider   naproxen (NAPROSYN) 500 MG tablet Take 1 tablet by mouth 2 times daily 21   EDUARDO Howard   famotidine (PEPCID) 20 MG tablet Take 1 tablet by mouth 2 times daily 21   Charlene Cornejo EZEKIEL   ondansetron (ZOFRAN ODT) 4 MG disintegrating tablet Take 1 tablet by mouth every 8 hours as needed for Nausea or Vomiting 7/30/21   Lyndon Roberts PA-C   dicyclomine (BENTYL) 10 MG capsule Take 2 capsules by mouth 4 times daily (before meals and nightly) 7/30/21   Jonatan Felix PA-C     No past surgical history on file. Past Medical History:   Diagnosis Date    COPD (chronic obstructive pulmonary disease) ([facility]     3 Years Ago  2018     Patient Active Problem List    Diagnosis Date Noted    Hypokalemia 04/12/2021       6. Level of Care Determination:          Level I          7.  Treatment available      __X__yes   _____no               8.  Name of program referred:    __X__[facility],    ____[facility],       _______other/identify           Electronically signed by RON Melendrez on 95/1/0580 at 5:01 PM

## 2022-11-03 NOTE — PROGRESS NOTES
612         Individual  Progress Note    Location: [x] [place] [] Margaret wilburn                   Patient Name: Gera Moore   : 1980     Case # :  [id number]  Therapist: RON Cabrera      1 hour       Katiuska Blackwood 44-year-old: two children: involved with Children Services and GAL: indicated children in custody of biological mother. S: Urbano Jose arrived session, frustrated with recommendations: tends to reflect attending sessions with not seeing children. O: Denies any homicidal and suicidal ideation: denies any psychosis, oriented x 4           A: Reviewed urine drug screen, completed remaining  psychosocial assessment, complete progress note, treatment plan and other pertinent and vital documentation essential for client to engage services. P: recommendations: consist of Individual sessions and outpatient group.            Electronically signed by RON Cabrera on 1194 at 6:54 PM         Anupam Morales 84, [place]

## 2022-11-07 ENCOUNTER — HOSPITAL ENCOUNTER (OUTPATIENT)
Dept: PSYCHIATRY | Age: 42
Setting detail: THERAPIES SERIES
Discharge: HOME | End: 2022-11-07
Payer: MEDICARE

## 2022-11-07 PROCEDURE — 90853 GROUP PSYCHOTHERAPY: CPT

## 2022-11-08 NOTE — GROUP NOTE
612 [facility] Group Therapy Note      11/8/2022    Location:  [facility]      Clients Presents: [id number], 0856, 9680, 1295, 1226    Clients Absent: 1323    Length of session: 1.5 hours    Group Note: OP    Group Type: Co-Ed    New members were welcomed and introduced. Norms and expectations of group were discussed. Content:  Understanding the difference between abuse,, dependence and allergic: Substance Use         RON Nunez  67/8/4036 01:11 AM      Co-Therapist: N/A      [facility] Individual Group Progress Note    Isabel Pat  1980 11/8/2022    Notes on Client Progress in Group        Marissa attended group and introduced himself and events leading to arrival: presented check in information: identified tolerance, craving and continuous use regardless of consequences.            RON Nunez  41/4/7549 19:86 AM        Co-Therapist: N/A

## 2022-11-10 ENCOUNTER — HOSPITAL ENCOUNTER (OUTPATIENT)
Dept: PSYCHIATRY | Age: 42
Setting detail: THERAPIES SERIES
Discharge: HOME | End: 2022-11-10
Payer: MEDICARE

## 2022-11-10 PROCEDURE — 90834 PSYTX W PT 45 MINUTES: CPT

## 2022-11-10 PROCEDURE — 80305 DRUG TEST PRSMV DIR OPT OBS: CPT

## 2022-11-11 NOTE — PROGRESS NOTES
612 [facility]        Individual  Progress Note    Location: [x] [place] [] Margaret wilburn                   Patient Name: Hardeep Barker   : 1980     Case # :  [id number]  Therapist: RON Melendrez      1 hour           Cindy Brunner 51-year-old: two children: involved with Children Services and GAL: indicated children in custody of biological mother. S: Parul Iron arrived session: identify abuse diagnosis history: expressed he and x girlfriend has history of extensive drinking.                   O: Denies any homicidal and suicidal ideation: denies any psychosis, oriented x 4              A: collected urine:  reviewed difference between abuse and dependence diagnosis            P: continue services          Electronically signed by RON Melendrez on  at 7:28 PM         Anupam Little 84, LSW, RON
Date: 2/3/2023 Code: On 11-10-22: identify abuse history: expressed he and x girlfriend has history of extensive drinking. 3)  Utilize, if needed case management services provided by [facility] to enhance abstaining from substance use Evaluation Date: 2/3/2023  Code: C Continue TBD                  3.    Problem: Limited experience and life regarding Relapse x 90 days             Goal: Identify and address the core dynamics and dilemmas that are perpetuating consequences and exacerbate relapses and triggers and in 90 days          Objectives:           1)  Complete and review with therapist at least 2 or more periods of being sober in 90 days Evaluation Date: 2/3/2023 Code: C Continue TBD                    2) Enhance 4 to 8 healthy techniques and coping skills to empower a healthy  relapse prevention plan x 90 days  Evaluation Date: 2/3/2023 Code: C Continue TB                  3) Journal, discuss, monitor  3 to 5 physiological, psychological, biological and mental alterations and coping skills of being sober: x 90 days  Evaluation Date:  2/3/2023       Code: C Continue TBD                  Defer: Address legal stipulations and mental health                   Discharge Plan/Instructions: Demonstrate constructive motivation to successfully complete outpatient treatment, finalize stipulations for probation and legal system and develop healthy sobriety plan. Ellen Thompson / 1980 has participated in the treatment plan development outlined above on 11/10/2022.              Chava Morales, [facility]  60/61/7604/7:39 PM

## 2022-11-14 ENCOUNTER — HOSPITAL ENCOUNTER (OUTPATIENT)
Dept: PSYCHIATRY | Age: 42
Setting detail: THERAPIES SERIES
Discharge: HOME | End: 2022-11-14
Payer: MEDICARE

## 2022-11-14 PROCEDURE — 90853 GROUP PSYCHOTHERAPY: CPT

## 2022-11-14 NOTE — GROUP NOTE
612 [facility] Group Therapy Note      11/14/2022    Location:  [facility]      Clients Presents: [id number], 3388, 0229, [id number], [id number], 1226    Clients Absent: 1323, 1295     Length of session: 1.5 hours    Group Note: OP    Group Type: Co-Ed    New members were welcomed and introduced. Norms and expectations of group were discussed. Content: Understanding Importance of Sober Support Systems: Marilia Allen [employer], other alternative [address] [place], [place]  03/82/8074 6:30 PM        Co-Therapist: N/A      [facility] Individual Group Progress Note    Bethel Retort  1980 11/14/2022    Notes on Client Progress in Group      Veverly Dryer introduced himself and events leading to arrival: presented check in information: identified his children as primary motivation.             Mark Lockett [facility]  26/40/3540 6:50 PM        Co-Therapist: N/A

## 2022-11-17 ENCOUNTER — HOSPITAL ENCOUNTER (OUTPATIENT)
Dept: PSYCHIATRY | Age: 42
Setting detail: THERAPIES SERIES
Discharge: HOME | End: 2022-11-17
Payer: MEDICARE

## 2022-11-17 PROCEDURE — 90834 PSYTX W PT 45 MINUTES: CPT

## 2022-11-17 NOTE — PROGRESS NOTES
[facility] TREATMENT PLAN           [facility] [place] [facility]        Treatment plan: Initial          Strengths: employed            Weakness/Limitations: Children Services            Service/Frequency/Duration: Individual Session x 1 time weekly x 90 days or as needed,  Urinalysis one time monthly x 90 days or as needed and one time weekly x 90 days A.A /  NA meetings            Diagnosis:         F10.10 Alcohol Use Disorder    F12.10 Cannabis Use Disorder                               Level of Care:  Standard  Outpatient and Individual sessions         Problem: History of Substance use            Goal: Enhance personalized knowledge and insight associated with mood altering substances x 90 days     Objectives:        1) Remind 2 to 6  detrimental consequences in major life areas regarding substance  use in 90 days Evaluation Date: 2/3/2023 Code: On 11-17-22: identified primary consequence loss of children. 2) Identify 4 to 8 psychological or physiological benefits /  gratitudes due to remaining substance free in 90 days: Evaluation Date: 2/3/2023 Code: C Continue TBD                   3) Identify and explain 2 to 4 explanations about relapse triggers. Explain 2 to 4 things about relapse. Identify 2 to 4 differences and similarities between internal and eternal triggers associated with substance use in 90 days and Evaluation Date: 2/3/2023 Code:  Continue TBD                2.    Problem: Limited knowledge regarding disease concept and substance use. Goal:  Enhance knowledge and understanding regarding disease concept associated with substance use.          Objectives:           1) Complete 2 to 4 assignments regarding biography of substance use, include onset, frequency, tolerance and amounts  and  in 90 days:  Evaluation Date: 2/3/2023 Code: Code:                  2) Complete assignment on difference  between substance abuse, substance dependence and disease concept of substance use in 90 days  Evaluation Date: 2/3/2023 Code: On 11-10-22: identify abuse history: expressed he and x girlfriend has history of extensive drinking. 3)  Utilize, if needed case management services provided by [facility] to enhance abstaining from substance use Evaluation Date: 2/3/2023  Code: C Continue TBD                  3.    Problem: Limited experience and life regarding Relapse x 90 days             Goal: Identify and address the core dynamics and dilemmas that are perpetuating consequences and exacerbate relapses and triggers and in 90 days          Objectives:           1)  Complete and review with therapist at least 2 or more periods of being sober in 90 days Evaluation Date: 2/3/2023 Code: C Continue TBD                    2) Enhance 4 to 8 healthy techniques and coping skills to empower a healthy  relapse prevention plan x 90 days  Evaluation Date: 2/3/2023 Code: C Continue TB                  3) Journal, discuss, monitor  3 to 5 physiological, psychological, biological and mental alterations and coping skills of being sober: x 90 days  Evaluation Date:  2/3/2023       Code: C Continue TBD                  Defer: Address legal stipulations and mental health                   Discharge Plan/Instructions: Demonstrate constructive motivation to successfully complete outpatient treatment, finalize stipulations for probation and legal system and develop healthy sobriety plan. Gera Moore / 1980 has participated in the treatment plan development outlined above on 11/17/2022.              Mariana Osman, [facility]  23/25/4242/5:28 PM

## 2022-11-17 NOTE — PROGRESS NOTES
[facility]        Individual  Progress Note    Location: [x] [place] [] Shikha Carey                   Patient Name: Lee Ann Fernandez   : 1980     Case # :  [id number]  Therapist: RON Jimenez      1 hour         Goal  # 1       Objective  # 1       Hugh Astorga 44-year-old: two children: involved with Children Services and GAL: indicated children in custody of biological mother. S: Tyler Montgomery arrived session: identified primary trigger loss of contact with children. O: Denies any homicidal and suicidal ideation: denies any psychosis, oriented x 4              A:  reviewed consequences.             P: continue services             Electronically signed by RON Jimenez on  at 6:49 PM       BROOKE [address], LSW, JEREMIAHCS

## 2022-11-21 ENCOUNTER — HOSPITAL ENCOUNTER (OUTPATIENT)
Dept: PSYCHIATRY | Age: 42
Setting detail: THERAPIES SERIES
Discharge: HOME | End: 2022-11-21
Payer: MEDICARE

## 2022-11-21 PROCEDURE — 90853 GROUP PSYCHOTHERAPY: CPT

## 2022-11-21 NOTE — GROUP NOTE
[facility] Group Therapy Note      11/21/2022    Location:  [employer]      Clients Presents: [id number],4033, 1310, 9680, 1295, 1226      Clients Absent: 1337, 1323      Length of session: 1.5 hours    Group Note: OP    Group Type: Co-Ed    New members were welcomed and introduced. Norms and expectations of group were discussed. Content: Understanding Triggers, Holidays and Relapse         Bhavana Conklin [facility]  79/59/3057 6:29 PM        Co-Therapist: N/A      [facility] Individual Group Progress Note    Marianne Quintanilla  1980 11/21/2022    Notes on Client Progress in Group      Vinny Smith attended group, introduced himself and events leading to arrival: presented check in information: expressed struggling with isolation, especially due to not being able to interact with children.          RON Mcmillan  27/49/9008 6:41 PM        Co-Therapist: N/A

## 2022-11-28 ENCOUNTER — HOSPITAL ENCOUNTER (OUTPATIENT)
Dept: PSYCHIATRY | Age: 42
Setting detail: THERAPIES SERIES
Discharge: HOME | End: 2022-11-28
Payer: MEDICARE

## 2022-11-28 PROCEDURE — 90853 GROUP PSYCHOTHERAPY: CPT

## 2022-11-29 NOTE — GROUP NOTE
612 [facility] Group Therapy Note      11/29/2022    Location:  [facility]      Clients Presents: [id number], [id number], 9680, 1295, 1226    Clients Absent: 1337, 1323    Length of session: 1.5 hours    Group Note: OP    Group Type: Co-Ed    New members were welcomed and introduced. Norms and expectations of group were discussed. Content: Understanding Stages of Grief,Substance Use and Relapse        RON Gonzales  28/29/4753 12:25 PM      Co-Therapist: N/A      [facility] Individual Group Progress Note    Melissa Rizzo  1980 11/29/2022    Notes on Client Progress in Group         Tyler Alva attended group: introduced himself and events leading to your arrival: presented check in information: identified loss of children: the stress with loss of family and relationship stress.          RON Gonzales  20/03/6674 12:40 PM      Co-Therapist: N/A

## 2022-12-01 ENCOUNTER — HOSPITAL ENCOUNTER (OUTPATIENT)
Dept: PSYCHIATRY | Age: 42
Setting detail: THERAPIES SERIES
Discharge: HOME | End: 2022-12-01
Payer: MEDICARE

## 2022-12-01 PROCEDURE — 90834 PSYTX W PT 45 MINUTES: CPT

## 2022-12-01 NOTE — PROGRESS NOTES
612 [facility]        Individual  Progress Note    Location: [x] [place] [] Elyn Gosselin                   Patient Name: Cheri Chua   : 1980     Case # :  [id number]  Therapist: RON Han      1 hour         Goal  # 1          Objective  # 2          Kina Birmingham 82-year-old: two children: involved with Children Services and GAL: indicated children in custody of biological mother. S: Haylee Tesfaye arrived session, still employed, identify he meet with , indicated possibility of seeing children due to mother non complaint with court order: recognized detaching from illegal activity and continue to encourage himself.                    O: Denies any homicidal and suicidal ideation: denies any psychosis, oriented x 4              A:  reviewed benefits            P: continue services          Electronically signed by RON Han on  at 5:05 PM         Anupam Miller 86, LSW, JEREMIAHCS

## 2022-12-01 NOTE — PROGRESS NOTES
[facility] TREATMENT PLAN           Location: [x] [place] [] [facility]        Treatment plan: Initial          Strengths: employed            Weakness/Limitations: Children Services            Service/Frequency/Duration: Individual Session x 1 time weekly x 90 days or as needed,  Urinalysis one time monthly x 90 days or as needed and one time weekly x 90 days A.A /  NA meetings            Diagnosis:         F10.10 Alcohol Use Disorder    F12.10 Cannabis Use Disorder                               Level of Care:  Standard  Outpatient and Individual sessions         Problem: History of Substance use            Goal: Enhance personalized knowledge and insight associated with mood altering substances x 90 days     Objectives:        1) Remind 2 to 6  detrimental consequences in major life areas regarding substance  use in 90 days Evaluation Date: 2/3/2023 Code: On 11-17-22: identified primary consequence loss of children. 2) Identify 4 to 8 psychological or physiological benefits /  gratitudes due to remaining substance free in 90 days: Evaluation Date: 2/3/2023 Code: On 12-1-22: identify he meet with , indicated possibility of seeing children due to mother non complaint with court order: recognized detaching from illegal activity and continue to encourage himself. 3) Identify and explain 2 to 4 explanations about relapse triggers. Explain 2 to 4 things about relapse. Identify 2 to 4 differences and similarities between internal and eternal triggers associated with substance use in 90 days and Evaluation Date: 2/3/2023 Code:  Continue TBD                2.    Problem: Limited knowledge regarding disease concept and substance use. Goal:  Enhance knowledge and understanding regarding disease concept associated with substance use.          Objectives:           1) Complete 2 to 4 assignments regarding biography of substance use, include onset, frequency, tolerance and amounts  and  in 90 days:  Evaluation Date: 2/3/2023 Code: Code:                  2) Complete assignment on difference  between substance abuse, substance dependence and disease concept of substance use in 90 days  Evaluation Date: 2/3/2023 Code: On 11-10-22: identify abuse history: expressed he and x girlfriend has history of extensive drinking. 3)  Utilize, if needed case management services provided by [facility] to enhance abstaining from substance use Evaluation Date: 2/3/2023  Code: C Continue TBD                  3.    Problem: Limited experience and life regarding Relapse x 90 days             Goal: Identify and address the core dynamics and dilemmas that are perpetuating consequences and exacerbate relapses and triggers and in 90 days          Objectives:           1)  Complete and review with therapist at least 2 or more periods of being sober in 90 days Evaluation Date: 2/3/2023 Code: C Continue TBD                    2) Enhance 4 to 8 healthy techniques and coping skills to empower a healthy  relapse prevention plan x 90 days  Evaluation Date: 2/3/2023 Code: C Continue TB                  3) Journal, discuss, monitor  3 to 5 physiological, psychological, biological and mental alterations and coping skills of being sober: x 90 days  Evaluation Date:  2/3/2023       Code: C Continue TBD                  Defer: Address legal stipulations and mental health                   Discharge Plan/Instructions: Demonstrate constructive motivation to successfully complete outpatient treatment, finalize stipulations for probation and legal system and develop healthy sobriety plan. Pablo Jackson / 1980 has participated in the treatment plan development outlined above on 12/1/2022.              Ulysses Eldridge, [facility]-  46/9/1358/0:79 PM

## 2022-12-05 ENCOUNTER — HOSPITAL ENCOUNTER (OUTPATIENT)
Dept: PSYCHIATRY | Age: 42
Setting detail: THERAPIES SERIES
Discharge: HOME | End: 2022-12-05
Payer: MEDICARE

## 2022-12-05 PROCEDURE — 90853 GROUP PSYCHOTHERAPY: CPT

## 2022-12-06 NOTE — GROUP NOTE
612 [facility] Group Therapy Note      12/6/2022    Location:  [facility]      Clients Presents: [id number], 1332, 1310, 9680, 1226    Clients Absent: 1337, [id number]     Length of session: 1.5 hours    Group Note: OP    Group Type: Co-Ed    New members were welcomed and introduced. Norms and expectations of group were discussed. Content: Dysfunctional Families: Roles: Impacted by Substance Use       RON Jaramillo  48/7/8695 6:89 PM      Co-Therapist: N/A      [facility] Individual Group Progress Note    Sheryl June  1980 12/6/2022    Notes on Client Progress in Group      Yaneli Anne attended group, introduced himself and events leading to arrival: presented check in information: expressed about missing children.          RON Jaramillo  40/1/7645 3:31 PM      Co-Therapist: N/A

## 2022-12-08 ENCOUNTER — HOSPITAL ENCOUNTER (OUTPATIENT)
Dept: PSYCHIATRY | Age: 42
Setting detail: THERAPIES SERIES
Discharge: HOME | End: 2022-12-08
Payer: MEDICARE

## 2022-12-08 NOTE — PROGRESS NOTES
612 [facility]        Individual  Progress Note    Location: [x] [place] [] Margaret wilburn                   Patient Name: Umair Radha   : 1980     Case # :  [id number]  Therapist: RON Wilcox        Cancel: attending court in [place]         Electronically signed by RON Wilcox on  at [address] [place] [address] RON

## 2022-12-12 ENCOUNTER — HOSPITAL ENCOUNTER (OUTPATIENT)
Dept: PSYCHIATRY | Age: 42
Setting detail: THERAPIES SERIES
Discharge: HOME | End: 2022-12-12
Payer: MEDICARE

## 2022-12-12 PROCEDURE — 90853 GROUP PSYCHOTHERAPY: CPT

## 2022-12-12 NOTE — GROUP NOTE
612 [facility] Group Therapy Note      12/12/2022    Location:  [place]      Clients Presents: 8595, 1310, 9680, 1295, 1226    Clients Absent: 1335, 1323    Length of session: 1.5 hours    Group Note: OP    Group Type: Co-Ed    New members were welcomed and introduced. Norms and expectations of group were discussed.       Content: Understanding Stages of Change: Pre Contemplation, Contemplation: Substance Use         RON Tracey  30/93/9249 6:02 PM      Co-Therapist: N/A      [facility] Individual Group Progress Note    Jay Jay Van  1980 12/12/2022    Notes on Client Progress in Group      Norbertwander Armijos attended group, introduced himself and events leading to arrival: presented check information: identified history of pre contemplation: expressed history of decreasing his drinking in past.             RON Tracey  09/27/2372 6:24 PM        Co-Therapist: N/A

## 2022-12-15 ENCOUNTER — HOSPITAL ENCOUNTER (OUTPATIENT)
Dept: PSYCHIATRY | Age: 42
Setting detail: THERAPIES SERIES
Discharge: HOME | End: 2022-12-15
Payer: MEDICARE

## 2022-12-15 NOTE — PROGRESS NOTES
612 [facility]        Individual  Progress Note    Location: [x] [place] [] Margaret wilburn                   Patient Name: Carver Remedies   : 1980     Case # :  [id number]  Therapist: RON Lunsford        Cancel: court          Electronically signed by RON Lunsford on  at 4:13 PM           Anupam Martinez Che, BYRON, JEREMIAHCS

## 2022-12-19 ENCOUNTER — HOSPITAL ENCOUNTER (OUTPATIENT)
Dept: PSYCHIATRY | Age: 42
Setting detail: THERAPIES SERIES
Discharge: HOME | End: 2022-12-19
Payer: MEDICARE

## 2022-12-19 PROCEDURE — 90853 GROUP PSYCHOTHERAPY: CPT

## 2022-12-20 NOTE — GROUP NOTE
612 [facility] Group Therapy Note      12/20/2022    Location:  [place]      Clients Presents: [id number], [id number], 1310, 1352, 1226      Clients Absent: 1348, 1295      Length of session: 1.5 hours    Group Note: OP    Group Type: Co-Ed    New members were welcomed and introduced. Norms and expectations of group were discussed. Content: Understanding Post Acute Withdrawal: Relapse and Substance Use           RON Jaramillo  36/80/9039 12:13 PM      Co-Therapist: N/A      [facility] Individual Group Progress Note    Sheryl June  1980 12/20/2022    Notes on Client Progress in Group      Yaneli Anne attended group, introduced himself and events leading to his arrival, presented check in information: identified stress, history of over reacting to situations and sleep.            RON Jaramillo  67/07/0911 12:29 PM        Co-Therapist: N/A

## 2022-12-22 ENCOUNTER — HOSPITAL ENCOUNTER (OUTPATIENT)
Dept: PSYCHIATRY | Age: 42
Setting detail: THERAPIES SERIES
Discharge: HOME | End: 2022-12-22
Payer: MEDICARE

## 2022-12-22 PROCEDURE — 90834 PSYTX W PT 45 MINUTES: CPT

## 2022-12-22 PROCEDURE — 80305 DRUG TEST PRSMV DIR OPT OBS: CPT

## 2022-12-22 NOTE — PROGRESS NOTES
[facility] TREATMENT PLAN           Location: [x] [place] [] Margaret wilburn        Treatment plan: Initial          Strengths: employed            Weakness/Limitations: Children Services            Service/Frequency/Duration: Individual Session x 1 time weekly x 90 days or as needed,  Urinalysis one time monthly x 90 days or as needed and one time weekly x 90 days A.A /  NA meetings            Diagnosis:         F10.10 Alcohol Use Disorder    F12.10 Cannabis Use Disorder                               Level of Care:  Standard  Outpatient and Individual sessions         Problem: History of Substance use            Goal: Enhance personalized knowledge and insight associated with mood altering substances x 90 days     Objectives:        1) Remind 2 to 6  detrimental consequences in major life areas regarding substance  use in 90 days Evaluation Date: 2/3/2023 Code: On 11-17-22: identified primary consequence loss of children. 2) Identify 4 to 8 psychological or physiological benefits /  gratitudes due to remaining substance free in 90 days: Evaluation Date: 2/3/2023 Code: On 12-1-22: identify he meet with , indicated possibility of seeing children due to mother non complaint with court order: recognized detaching from illegal activity and continue to encourage himself. On 12-22-22: identified starting direct visit with  supervising with daughters on 12-19-22: expressed being more hopeful, encouraged: recognize the benefits of not drinking, following legal recommendations. 3) Identify and explain 2 to 4 explanations about relapse triggers. Explain 2 to 4 things about relapse. Identify 2 to 4 differences and similarities between internal and eternal triggers associated with substance use in 90 days and Evaluation Date: 2/3/2023 Code:  Continue TBD                2.    Problem: Limited knowledge regarding disease concept and substance use. development outlined above on 12/22/2022.              Candice Calles [facility][note 3]-CS  92/24/4489/9:01 PM

## 2022-12-22 NOTE — PROGRESS NOTES
612 [facility]        Individual  Progress Note    Location: [x] [place] [] Sameera Souza                   Patient Name: Raciel Ramos   : 1980     Case # :  [id number]  Therapist: RON Rae         1 hour         Goal  # 1          Objective  #  2           Yoon Gordon 51-year-old: two children: involved with Children Services and GAL: indicated children in custody of biological mother. S: Deandra Olivas arrived session, still employed,  identified starting direct visit with  supervising with daughters on 22: expressed being more hopeful, encouraged: recognize the benefits of not drinking, following legal recommendations. O: Denies any homicidal and suicidal ideation: denies any psychosis, oriented x 4              A:  reviewed importance of optimistic thoughts, addressing negative thoughts with constructive healthy decisions.               P: continue services          Electronically signed by RON Rae on  at 5:05 PM         Anupam Newman 86, LSW, RON

## 2022-12-29 ENCOUNTER — HOSPITAL ENCOUNTER (OUTPATIENT)
Dept: PSYCHIATRY | Age: 42
Setting detail: THERAPIES SERIES
Discharge: HOME | End: 2022-12-29
Payer: MEDICARE

## 2022-12-29 PROCEDURE — 90834 PSYTX W PT 45 MINUTES: CPT

## 2022-12-29 NOTE — PROGRESS NOTES
612 [facility]        Individual  Progress Note    Location: [x] [place] [] Margaret wilburn                   Patient Name: Hardeep Barker   : 1980     Case # :  [id number]  Therapist: RON Melendrez         1 hour           Goal  #  2         Objective  #  2           Donnel Brunner 72-year-old: two children: involved with Children Services and GAL: indicated children in custody of biological mother. S: Parul Iron arrived session: onset of snorting cocaine: indicated last snorted age 41: tried 3 or 4 times: used while consuming alcohol. Valid Marijuana card: Onset of smoking marijuana: age 27: last use gummies: improved his sleep: last two year concerned about shoulder injury: in age 23 and 29: daily smoker: age 38 to 52: occasional smoker several times weekly. Onset of consuming alcohol: age 31: 4 o 5 shots daily: no drinking since 2018.               O: Denies any homicidal and suicidal ideation: denies any psychosis, oriented x 4              A:  reviewed substance history             P: continue services            Electronically signed by RON Melendrez on  at 4:46 PM         Percilla Peal, [address], RON MATTHEWS

## 2022-12-29 NOTE — PROGRESS NOTES
[facility]           Location: [x] [place] [] Margaret wilburn        Treatment plan: Initial          Strengths: employed            Weakness/Limitations: Children Services            Service/Frequency/Duration: Individual Session x 1 time weekly x 90 days or as needed,  Urinalysis one time monthly x 90 days or as needed and one time weekly x 90 days A.A /  NA meetings            Diagnosis:         F10.10 Alcohol Use Disorder    F12.10 Cannabis Use Disorder                               Level of Care:  Standard  Outpatient and Individual sessions         Problem: History of Substance use            Goal: Enhance personalized knowledge and insight associated with mood altering substances x 90 days     Objectives:        1) Remind 2 to 6  detrimental consequences in major life areas regarding substance  use in 90 days Evaluation Date: 2/3/2023 Code: On 11-17-22: identified primary consequence loss of children. 2) Identify 4 to 8 psychological or physiological benefits /  gratitudes due to remaining substance free in 90 days: Evaluation Date: 2/3/2023 Code: On 12-1-22: identify he meet with , indicated possibility of seeing children due to mother non complaint with court order: recognized detaching from illegal activity and continue to encourage himself. On 12-22-22: identified starting direct visit with  supervising with daughters on 12-19-22: expressed being more hopeful, encouraged: recognize the benefits of not drinking, following legal recommendations. 3) Identify and explain 2 to 4 explanations about relapse triggers. Explain 2 to 4 things about relapse. Identify 2 to 4 differences and similarities between internal and eternal triggers associated with substance use in 90 days and Evaluation Date: 2/3/2023 Code:  Continue TBD                2.    Problem: Limited knowledge regarding disease concept and substance use. Goal:  Enhance knowledge and understanding regarding disease concept associated with substance use. Objectives:           1) Complete 2 to 4 assignments regarding biography of substance use, include onset, frequency, tolerance and amounts  and  in 90 days:  Evaluation Date: 2/3/2023 Code: Code:                    2) Complete assignment on difference  between substance abuse, substance dependence and disease concept of substance use in 90 days  Evaluation Date: 2/3/2023 Code: On 11-10-22: identify abuse history: expressed he and x girlfriend has history of extensive drinking. On 12-29-22: Onset of snorting cocaine: indicated last snorted age 41: tried 3 or 4 times: used while consuming alcohol. Valid Marijuana card: Onset of smoking marijuana: age 27: last use gummies: improved his sleep: last two year concerned about shoulder injury: in age 23 and 29: daily smoker: age 38 to 52: occasional smoker several times weekly. Onset of consuming alcohol: age 31: 4 o 5 shots daily: no drinking since 2018.               3)  Utilize, if needed case management services provided by [facility] to enhance abstaining from substance use Evaluation Date: 2/3/2023  Code: C Continue TBD                  3.    Problem: Limited experience and life regarding Relapse x 90 days             Goal: Identify and address the core dynamics and dilemmas that are perpetuating consequences and exacerbate relapses and triggers and in 90 days          Objectives:           1)  Complete and review with therapist at least 2 or more periods of being sober in 90 days Evaluation Date: 2/3/2023 Code: C Continue TBD                    2) Enhance 4 to 8 healthy techniques and coping skills to empower a healthy  relapse prevention plan x 90 days  Evaluation Date: 2/3/2023 Code: C Continue TB                  3) Journal, discuss, monitor  3 to 5 physiological, psychological, biological and mental alterations and coping skills of being sober: x 90 days  Evaluation Date:  2/3/2023       Code: C Continue TBD                  Defer: Address legal stipulations and mental health                   Discharge Plan/Instructions: Demonstrate constructive motivation to successfully complete outpatient treatment, finalize stipulations for probation and legal system and develop healthy sobriety plan. Jesus Orlando / 1980 has participated in the treatment plan development outlined above on 12/29/2022.              Duran Aguilera [facility]  85/23/8377/5:81 PM

## 2023-01-05 ENCOUNTER — HOSPITAL ENCOUNTER (OUTPATIENT)
Dept: PSYCHIATRY | Age: 43
Setting detail: THERAPIES SERIES
Discharge: HOME | End: 2023-01-05
Payer: MEDICARE

## 2023-01-05 PROCEDURE — 80305 DRUG TEST PRSMV DIR OPT OBS: CPT

## 2023-01-05 PROCEDURE — 90834 PSYTX W PT 45 MINUTES: CPT

## 2023-01-05 NOTE — PROGRESS NOTES
[facility]           Location: [x] [place] [] Patria Clark        Treatment plan: Initial          Strengths: employed            Weakness/Limitations: Children Services            Service/Frequency/Duration: Individual Session x 1 time weekly x 90 days or as needed,  Urinalysis one time monthly x 90 days or as needed and one time weekly x 90 days A.A /  NA meetings            Diagnosis:         F10.10 Alcohol Use Disorder    F12.10 Cannabis Use Disorder                               Level of Care:  Standard  Outpatient and Individual sessions         Problem: History of Substance use            Goal: Enhance personalized knowledge and insight associated with mood altering substances x 90 days     Objectives:        1) Remind 2 to 6  detrimental consequences in major life areas regarding substance  use in 90 days Evaluation Date: 2/3/2023 Code: On 11-17-22: identified primary consequence loss of children. 2) Identify 4 to 8 psychological or physiological benefits /  gratitudes due to remaining substance free in 90 days: Evaluation Date: 2/3/2023 Code: On 12-1-22: identify he meet with , indicated possibility of seeing children due to mother non complaint with court order: recognized detaching from illegal activity and continue to encourage himself. On 12-22-22: identified starting direct visit with  supervising with daughters on 12-19-22: expressed being more hopeful, encouraged: recognize the benefits of not drinking, following legal recommendations. On 1-5-23: identified children services plan to terminate case and return children to his custody: excited: identified the importance having housing , job and being confident. 3) Identify and explain 2 to 4 explanations about relapse triggers. Explain 2 to 4 things about relapse.  Identify 2 to 4 differences and similarities between internal and eternal triggers associated with substance use in 90 days and Evaluation Date: 2/3/2023 Code:  Continue TBD                2.    Problem: Limited knowledge regarding disease concept and substance use. Goal:  Enhance knowledge and understanding regarding disease concept associated with substance use. Objectives:           1) Complete 2 to 4 assignments regarding biography of substance use, include onset, frequency, tolerance and amounts  and  in 90 days:  Evaluation Date: 2/3/2023 Code: Code:                    2) Complete assignment on difference  between substance abuse, substance dependence and disease concept of substance use in 90 days  Evaluation Date: 2/3/2023 Code: On 11-10-22: identify abuse history: expressed he and x girlfriend has history of extensive drinking. On 12-29-22: Onset of snorting cocaine: indicated last snorted age 41: tried 3 or 4 times: used while consuming alcohol. Valid Marijuana card: Onset of smoking marijuana: age 27: last use gummies: improved his sleep: last two year concerned about shoulder injury: in age 23 and 29: daily smoker: age 38 to 52: occasional smoker several times weekly. Onset of consuming alcohol: age 31: 4 o 5 shots daily: no drinking since 2018.               3)  Utilize, if needed case management services provided by [facility] to enhance abstaining from substance use Evaluation Date: 2/3/2023  Code: C Continue TBD                  3.    Problem: Limited experience and life regarding Relapse x 90 days             Goal: Identify and address the core dynamics and dilemmas that are perpetuating consequences and exacerbate relapses and triggers and in 90 days          Objectives:           1)  Complete and review with therapist at least 2 or more periods of being sober in 90 days Evaluation Date: 2/3/2023 Code: C Continue TBD                    2) Enhance 4 to 8 healthy techniques and coping skills to empower a healthy  relapse prevention plan x 90 days  Evaluation Date: 2/3/2023 Code: C Continue TB                  3) Journal, discuss, monitor  3 to 5 physiological, psychological, biological and mental alterations and coping skills of being sober: x 90 days  Evaluation Date:  2/3/2023       Code: C Continue TBD                  Defer: Address legal stipulations and mental health                   Discharge Plan/Instructions: Demonstrate constructive motivation to successfully complete outpatient treatment, finalize stipulations for probation and legal system and develop healthy sobriety plan. Torrie Huffman / 1980 has participated in the treatment plan development outlined above on 1/5/2023.              Willean Kussmaul, [facility]-CS  5/7/6615/1:70 PM

## 2023-01-05 NOTE — PROGRESS NOTES
612 [facility]        Individual  Progress Note    [facility] [place] [facility]                   Patient Name: Rosie Hubbard   : 1980     Case # :  [id number]  Therapist: RON Finley      1 hour            Goal  #  2         Objective  #  2           Daniel Mendieta 44-year-old: two children: involved with Children Services and GAL: indicated children in custody of biological mother. S: Pee Peterson arrived session: identified children services plan to terminate case and return children to his custody: excited: identified the importance having housing , job and being confident.                    O: Denies any homicidal and suicidal ideation: denies any psychosis, oriented x 4              A: reviewed importance of being positive self talk being determined            P: continue services                 Electronically signed by RON Finley on [id number] at 5:14 PM         Karyle Cai, [address], JEREMIAH MATTHEWSCS

## 2023-01-09 ENCOUNTER — HOSPITAL ENCOUNTER (OUTPATIENT)
Dept: PSYCHIATRY | Age: 43
Setting detail: THERAPIES SERIES
Discharge: HOME | End: 2023-01-09
Payer: MEDICARE

## 2023-01-09 PROCEDURE — 90853 GROUP PSYCHOTHERAPY: CPT

## 2023-01-09 NOTE — GROUP NOTE
612 [facility] Group Therapy Note      1/9/2023    Location:  [place]      Clients Presents: 0930, 1332, 1348, 1310, 1352, 1226     Clients Absent: 1335, 1323, 1330, 1333, 1326       Length of session: 1.5 hours    Group Note: OP    Group Type: Co-Ed    New members were welcomed and introduced. Norms and expectations of group were discussed. Content: Understanding Substance Use Progression, Tolerance and Secondary Consequences         RON Tracey  1/0/0943 3:03 PM      Co-Therapist: N/A      [facility] Individual Group Progress Note    Jay Jay Van  1980 1/9/2023    Notes on Client Progress in Group      Marissa attended group: introduced himself and events leading to arrival: presented check in information: identified progression of drinking, impacted consequences: family, money, health.         RON Tracey  4/2/7040 0:92 PM        Co-Therapist: N/A

## 2023-01-12 ENCOUNTER — HOSPITAL ENCOUNTER (OUTPATIENT)
Dept: PSYCHIATRY | Age: 43
Setting detail: THERAPIES SERIES
Discharge: HOME | End: 2023-01-12
Payer: MEDICARE

## 2023-01-12 PROCEDURE — 90834 PSYTX W PT 45 MINUTES: CPT

## 2023-01-13 NOTE — PROGRESS NOTES
[facility] TREATMENT PLAN           Location: [x] [place] [] Margaret akila        Treatment plan: Initial          Strengths: employed            Weakness/Limitations: Children Services            Service/Frequency/Duration: Individual Session x 1 time weekly x 90 days or as needed,  Urinalysis one time monthly x 90 days or as needed and one time weekly x 90 days A.A /  NA meetings            Diagnosis:         F10.10 Alcohol Use Disorder    F12.10 Cannabis Use Disorder                               Level of Care:  Standard  Outpatient and Individual sessions         Problem: History of Substance use            Goal: Enhance personalized knowledge and insight associated with mood altering substances x 90 days     Objectives:        1) Remind 2 to 6  detrimental consequences in major life areas regarding substance  use in 90 days Evaluation Date: 2/3/2023 Code: On 11-17-22: identified primary consequence loss of children. 2) Identify 4 to 8 psychological or physiological benefits /  gratitudes due to remaining substance free in 90 days: Evaluation Date: 2/3/2023 Code: On 12-1-22: identify he meet with , indicated possibility of seeing children due to mother non complaint with court order: recognized detaching from illegal activity and continue to encourage himself. On 12-22-22: identified starting direct visit with  supervising with daughters on 12-19-22: expressed being more hopeful, encouraged: recognize the benefits of not drinking, following legal recommendations. On 1-5-23: identified children services plan to terminate case and return children to his custody: excited: identified the importance having housing , job and being confident.         On 1-12-23: discussed personal determination and maintaining the thought his children needed him and he needed them: children reunited with him as 1-10-23: grateful positive thinking and being focused. 3) Identify and explain 2 to 4 explanations about relapse triggers. Explain 2 to 4 things about relapse. Identify 2 to 4 differences and similarities between internal and eternal triggers associated with substance use in 90 days and Evaluation Date: 2/3/2023 Code:  Continue TBD                2.    Problem: Limited knowledge regarding disease concept and substance use. Goal:  Enhance knowledge and understanding regarding disease concept associated with substance use. Objectives:           1) Complete 2 to 4 assignments regarding biography of substance use, include onset, frequency, tolerance and amounts  and  in 90 days:  Evaluation Date: 2/3/2023 Code: Code:                    2) Complete assignment on difference  between substance abuse, substance dependence and disease concept of substance use in 90 days  Evaluation Date: 2/3/2023 Code: On 11-10-22: identify abuse history: expressed he and x girlfriend has history of extensive drinking. On 12-29-22: Onset of snorting cocaine: indicated last snorted age 41: tried 3 or 4 times: used while consuming alcohol. Valid Marijuana card: Onset of smoking marijuana: age 27: last use gummies: improved his sleep: last two year concerned about shoulder injury: in age 23 and 29: daily smoker: age 38 to 52: occasional smoker several times weekly. Onset of consuming alcohol: age 31: 4 o 5 shots daily: no drinking since 2018.               3)  Utilize, if needed case management services provided by [facility] to enhance abstaining from substance use Evaluation Date: 2/3/2023  Code: C Continue TBD                  3.    Problem: Limited experience and life regarding Relapse x 90 days             Goal: Identify and address the core dynamics and dilemmas that are perpetuating consequences and exacerbate relapses and triggers and in 90 days          Objectives:           1)  Complete and review with therapist at least 2 or more periods of being sober in 90 days Evaluation Date: 2/3/2023 Code: C Continue TBD                    2) Enhance 4 to 8 healthy techniques and coping skills to empower a healthy  relapse prevention plan x 90 days  Evaluation Date: 2/3/2023 Code: C Continue TB                  3) Journal, discuss, monitor  3 to 5 physiological, psychological, biological and mental alterations and coping skills of being sober: x 90 days  Evaluation Date:  2/3/2023       Code: C Continue TBD                  Defer: Address legal stipulations and mental health                   Discharge Plan/Instructions: Demonstrate constructive motivation to successfully complete outpatient treatment, finalize stipulations for probation and legal system and develop healthy sobriety plan. Isabel Pat / 1980 has participated in the treatment plan development outlined above on 1/12/2023.              Cyrus Montelongo [facility] Care[facility]NALISE-CHRIS  3/66/8444/6:08 PM

## 2023-01-13 NOTE — PROGRESS NOTES
612 [facility]        Individual  Progress Note    Location: [x] [place] [] Margaret wilburn                   Patient Name: Nba Stark   : 1980     Case # :  [id number]   Therapist: RON Hickey        1 hour            Goal  #  1        Objective  #  2           Aleksandr Rai 74-year-old: two children: involved with Children Services and GAL: indicated children in custody of biological mother. S: Nata Kp arrived excited:  discussed personal determination and maintaining the thought his children needed him and he needed them: children reunited with him as 1-10-23: grateful positive thinking and being focused. O: Denies any homicidal and suicidal ideation: denies any psychosis, oriented x 4                A: reviewed importance of recognizing gratitude and positive thoughts.             P: continue services            Electronically signed by RON Hickey on  at 7:28 PM         Anupam Muñoz 86, LSW, RON

## 2023-01-16 ENCOUNTER — HOSPITAL ENCOUNTER (OUTPATIENT)
Dept: PSYCHIATRY | Age: 43
Setting detail: THERAPIES SERIES
Discharge: HOME | End: 2023-01-16
Payer: MEDICARE

## 2023-01-16 PROCEDURE — 90853 GROUP PSYCHOTHERAPY: CPT

## 2023-01-17 NOTE — GROUP NOTE
612 [facility] Group Therapy Note      1/17/2023    Location:  [place]        Clients Presents: 6028, 4073, 1330, 1304, 1333, 1365, 1226      Clients Absent: 1348, 1323, 9645, 8654         Length of session: 1.5 hours    Group Note: OP    Group Type: Co-Ed    New members were welcomed and introduced. Norms and expectations of group were discussed. Content: Understanding Dysfunctional Families and Substance Use         RON Adams  8/77/2910 8:91 PM      Co-Therapist: N/A      [facility] Individual Group Progress Note    Sylvia Haines  1980 1/17/2023    Notes on Client Progress in Group        Marissa attended group, introduced himself and events leading to arrival, presented check in information: discussed  his and x wife relationship impacted his children.             RON Adams  4/34/5962 4:08 PM      Co-Therapist: N/A

## 2023-01-19 ENCOUNTER — HOSPITAL ENCOUNTER (OUTPATIENT)
Dept: PSYCHIATRY | Age: 43
Setting detail: THERAPIES SERIES
Discharge: HOME | End: 2023-01-19
Payer: MEDICARE

## 2023-01-19 PROCEDURE — 90834 PSYTX W PT 45 MINUTES: CPT

## 2023-01-19 PROCEDURE — 90832 PSYTX W PT 30 MINUTES: CPT

## 2023-01-19 PROCEDURE — 80305 DRUG TEST PRSMV DIR OPT OBS: CPT

## 2023-01-19 NOTE — PROGRESS NOTES
612 [facility]        Individual  Progress Note    Location: [x] [place] [] Margaret wilburn                   Patient Name: Rachid Sánchez   : 1980     Case # :  [id number]  Therapist: RON Roberson      1 hour            Goal  #  1        Objective  #  3           Merritt Saint 45-year-old: two children: involved with Children Services and GAL: indicated children in custody of biological mother. S: Jenny Baze arrived, still employed: internal triggers: identify detach or not being able to see children, maintaining healthy boundaries with children's mother.                      O: Denies any homicidal and suicidal ideation: denies any psychosis, oriented x 4              A: reviewed relapse triggers and coping skills              P: continue services          Electronically signed by RON Roberson on  at 4:40 PM       Anupam Daigle, LSW, JEREMIAHCS

## 2023-01-19 NOTE — PROGRESS NOTES
[facility] TREATMENT PLAN           Location: [x] [place] [] Sofía Nunes        Treatment plan: Initial          Strengths: employed            Weakness/Limitations: Children Services            Service/Frequency/Duration: Individual Session x 1 time weekly x 90 days or as needed,  Urinalysis one time monthly x 90 days or as needed and one time weekly x 90 days A.A /  NA meetings            Diagnosis:         F10.10 Alcohol Use Disorder    F12.10 Cannabis Use Disorder                               Level of Care:  Standard  Outpatient and Individual sessions         Problem: History of Substance use            Goal: Enhance personalized knowledge and insight associated with mood altering substances x 90 days     Objectives:        1) Remind 2 to 6  detrimental consequences in major life areas regarding substance  use in 90 days Evaluation Date: 2/3/2023 Code: On 11-17-22: identified primary consequence loss of children. 2) Identify 4 to 8 psychological or physiological benefits /  gratitudes due to remaining substance free in 90 days: Evaluation Date: 2/3/2023 Code: On 12-1-22: identify he meet with , indicated possibility of seeing children due to mother non complaint with court order: recognized detaching from illegal activity and continue to encourage himself. On 12-22-22: identified starting direct visit with  supervising with daughters on 12-19-22: expressed being more hopeful, encouraged: recognize the benefits of not drinking, following legal recommendations. On 1-5-23: identified children services plan to terminate case and return children to his custody: excited: identified the importance having housing , job and being confident.         On 1-12-23: discussed personal determination and maintaining the thought his children needed him and he needed them: children reunited with him as 1-10-23: grateful positive thinking and being focused. 3) Identify and explain 2 to 4 explanations about relapse triggers. Explain 2 to 4 things about relapse. Identify 2 to 4 differences and similarities between internal and eternal triggers associated with substance use in 90 days and Evaluation Date: 2/3/2023 Code: On 1-19-23: internal triggers: identify detach or not being able to see children, maintaining healthy boundaries with children's mother. 2.    Problem: Limited knowledge regarding disease concept and substance use. Goal:  Enhance knowledge and understanding regarding disease concept associated with substance use. Objectives:           1) Complete 2 to 4 assignments regarding biography of substance use, include onset, frequency, tolerance and amounts  and  in 90 days:  Evaluation Date: 2/3/2023 Code: Code:                    2) Complete assignment on difference  between substance abuse, substance dependence and disease concept of substance use in 90 days  Evaluation Date: 2/3/2023 Code: On 11-10-22: identify abuse history: expressed he and x girlfriend has history of extensive drinking. On 12-29-22: Onset of snorting cocaine: indicated last snorted age 41: tried 3 or 4 times: used while consuming alcohol. Valid Marijuana card: Onset of smoking marijuana: age 27: last use gummies: improved his sleep: last two year concerned about shoulder injury: in age 23 and 29: daily smoker: age 38 to 52: occasional smoker several times weekly. Onset of consuming alcohol: age 31: 4 o 5 shots daily: no drinking since 2018.               3)  Utilize, if needed case management services provided by [facility] to enhance abstaining from substance use Evaluation Date: 2/3/2023  Code: C Continue TBD                  3.    Problem: Limited experience and life regarding Relapse x 90 days             Goal: Identify and address the core dynamics and dilemmas that are perpetuating consequences and exacerbate relapses and triggers and in 90 days          Objectives:           1)  Complete and review with therapist at least 2 or more periods of being sober in 90 days Evaluation Date: 2/3/2023 Code: C Continue TBD                    2) Enhance 4 to 8 healthy techniques and coping skills to empower a healthy  relapse prevention plan x 90 days  Evaluation Date: 2/3/2023 Code: C Continue TB                  3) Journal, discuss, monitor  3 to 5 physiological, psychological, biological and mental alterations and coping skills of being sober: x 90 days  Evaluation Date:  2/3/2023       Code: C Continue TBD                  Defer: Address legal stipulations and mental health                   Discharge Plan/Instructions: Demonstrate constructive motivation to successfully complete outpatient treatment, finalize stipulations for probation and legal system and develop healthy sobriety plan. Mendel Palacios / 1980 has participated in the treatment plan development outlined above on 1/19/2023.              Diana Ocampo, [facility]  3/45/2891/3:92 PM

## 2023-01-23 ENCOUNTER — HOSPITAL ENCOUNTER (OUTPATIENT)
Dept: PSYCHIATRY | Age: 43
Setting detail: THERAPIES SERIES
Discharge: HOME | End: 2023-01-23
Payer: MEDICARE

## 2023-01-23 PROCEDURE — 90853 GROUP PSYCHOTHERAPY: CPT

## 2023-01-24 NOTE — GROUP NOTE
612 [facility] Group Therapy Note      1/24/2023    Location:  [employer]      Clients Presents: 6272, 6018, 3096, 1330, 1304, 1333, 1365, 1226    Clients Absent: 6945, 1348      Length of session: 1.5 hours    Group Note: OP    Group Type: Co-Ed    New members were welcomed and introduced. Norms and expectations of group were discussed.       Content: Understanding Toxic/ Relationship, Anger and Substance Use       RON Naidu  6/45/1274 9:76 AM      Co-Therapist: N/A      [facility] Individual Group Progress Note    Gina Hinton  1980 1/24/2023    Notes on Client Progress in Group      Marissa attended group, introduced himself and events leading to arrival, presented check in information: identified avoidance, moral and moral anger         RON Naidu  4/42/8836 51:44 AM        Co-Therapist: N/A

## 2023-01-26 ENCOUNTER — HOSPITAL ENCOUNTER (OUTPATIENT)
Dept: PSYCHIATRY | Age: 43
Setting detail: THERAPIES SERIES
Discharge: HOME | End: 2023-01-26
Payer: MEDICARE

## 2023-01-26 PROCEDURE — 90832 PSYTX W PT 30 MINUTES: CPT

## 2023-01-26 NOTE — PROGRESS NOTES
612 [facility] Discharge Treatment Plan    Christie Riggs  1980  Case # [id number]    Location: [x] [place] [] [place]. Stresses that I need to monitor      1. Financial stress   2. Overwhelmed at work   3.   4.     B. Major triggers to using alcohol/drugs       1. X wife   2. Stress of court and children services   3. Sober Plan     1. Detach from and maintain healthy boundaries by utilizing witnesses   2. Completed legal and Children Services  expectations   3.     C. Sobriety Support         1. Friend:   2. Treatment staff: [facility]   3. Family member: mother and kids   3. AA/NA recovery program, sponsor, meetings day/times, daily ready:         D. Non-using activities      1. Engaged sporting activities with children    2.   3.         E. Consequences of Drug/Alcohol use        1. Financial loss  2. Loss time interacting with daughters   3.      G. Short term goals to achieve      1. Position self and improve credit score   2. Purchase home           H. Follow up recommendations        1. Finalize legal stipulations   2. Christie Caputodavidkimberly / 1980 has participated in the discharge treatment plan development outlined above on 1/26/2023.            Maia Flynn [facility] Center[facility]CHRIS  9/96/3329/5:41 PM

## 2023-01-27 NOTE — PROGRESS NOTES
612 [facility]        Individual  Progress Note    Location: [x] [place] [] Margaret wilburn                   Patient Name: Magalie Lerma   : 1980     Case # :  [id number]  Therapist: RON Brown      1 hour              Goal  #  3        Objective  #  2           Cornelius Ayala 46-year-old: two children: involved with Children Services and GAL: indicated children in custody of biological mother.               S: Jayne Lovell arrived still employed, interacting with children:  identify relapse triggers: directly interacting with daughters mother, being unemployed and feeling overwhelmed                     O: Denies any homicidal and suicidal ideation: denies any psychosis, oriented x 4                 A: completed discharge treatment plan,  addressed coping skills, healthy supportive plan            P: continue services                 Electronically signed by RON Brown on  at 7:15 PM       Anupam Hamilton [address] [place] [address], LUIS-CS

## 2023-01-27 NOTE — PROGRESS NOTES
[facility] TREATMENT PLAN           Location: [x] [place] [] Margaret akila        Treatment plan: Initial          Strengths: employed            Weakness/Limitations: Children Services            Service/Frequency/Duration: Individual Session x 1 time weekly x 90 days or as needed,  Urinalysis one time monthly x 90 days or as needed and one time weekly x 90 days A.A /  NA meetings            Diagnosis:         F10.10 Alcohol Use Disorder    F12.10 Cannabis Use Disorder                               Level of Care:  Standard  Outpatient and Individual sessions         Problem: History of Substance use            Goal: Enhance personalized knowledge and insight associated with mood altering substances x 90 days     Objectives:        1) Remind 2 to 6  detrimental consequences in major life areas regarding substance  use in 90 days Evaluation Date: 2/3/2023 Code: On 11-17-22: identified primary consequence loss of children. 2) Identify 4 to 8 psychological or physiological benefits /  gratitudes due to remaining substance free in 90 days: Evaluation Date: 2/3/2023 Code: On 12-1-22: identify he meet with , indicated possibility of seeing children due to mother non complaint with court order: recognized detaching from illegal activity and continue to encourage himself. On 12-22-22: identified starting direct visit with  supervising with daughters on 12-19-22: expressed being more hopeful, encouraged: recognize the benefits of not drinking, following legal recommendations. On 1-5-23: identified children services plan to terminate case and return children to his custody: excited: identified the importance having housing , job and being confident.         On 1-12-23: discussed personal determination and maintaining the thought his children needed him and he needed them: children reunited with him as 1-10-23: grateful positive thinking and being focused. 3) Identify and explain 2 to 4 explanations about relapse triggers. Explain 2 to 4 things about relapse. Identify 2 to 4 differences and similarities between internal and eternal triggers associated with substance use in 90 days and Evaluation Date: 2/3/2023 Code: On 1-19-23: internal triggers: identify detach or not being able to see children, maintaining healthy boundaries with children's mother. 2.    Problem: Limited knowledge regarding disease concept and substance use. Goal:  Enhance knowledge and understanding regarding disease concept associated with substance use. Objectives:           1) Complete 2 to 4 assignments regarding biography of substance use, include onset, frequency, tolerance and amounts  and  in 90 days:  Evaluation Date: 2/3/2023 Code: Code:                    2) Complete assignment on difference  between substance abuse, substance dependence and disease concept of substance use in 90 days  Evaluation Date: 2/3/2023 Code: On 11-10-22: identify abuse history: expressed he and x girlfriend has history of extensive drinking. On 12-29-22: Onset of snorting cocaine: indicated last snorted age 41: tried 3 or 4 times: used while consuming alcohol. Valid Marijuana card: Onset of smoking marijuana: age 27: last use gummies: improved his sleep: last two year concerned about shoulder injury: in age 23 and 29: daily smoker: age 38 to 52: occasional smoker several times weekly. Onset of consuming alcohol: age 31: 4 o 5 shots daily: no drinking since 2018.               3)  Utilize, if needed case management services provided by [facility] to enhance abstaining from substance use Evaluation Date: 2/3/2023  Code: C Continue TBD                  3.    Problem: Limited experience and life regarding Relapse x 90 days             Goal: Identify and address the core dynamics and dilemmas that are perpetuating consequences and exacerbate relapses and triggers and in 90 days          Objectives:           1)  Complete and review with therapist at least 2 or more periods of being sober in 90 days Evaluation Date: 2/3/2023 Code: C Continue TBD                    2) Enhance 4 to 8 healthy techniques and coping skills to empower a healthy  relapse prevention plan x 90 days  Evaluation Date: 2/3/2023 Code:         1-26-23; identify relapse triggers: directly interacting with daughters mother, being unemployed and feeling overwhelmed             3) Journal, discuss, monitor  3 to 5 physiological, psychological, biological and mental alterations and coping skills of being sober: x 90 days  Evaluation Date:  2/3/2023       Code: C Continue TBD                  Defer: Address legal stipulations and mental health                   Discharge Plan/Instructions: Demonstrate constructive motivation to successfully complete outpatient treatment, finalize stipulations for probation and legal system and develop healthy sobriety plan. Qasim Castro / 1980 has participated in the treatment plan development outlined above on 1/26/2023.              Mony Blandon [facility] Hospital[facility]-CS  5/54/4014/5:16 PM

## 2023-01-30 ENCOUNTER — APPOINTMENT (OUTPATIENT)
Dept: PSYCHIATRY | Age: 43
End: 2023-01-30
Payer: MEDICARE

## 2023-07-08 ENCOUNTER — HOSPITAL ENCOUNTER (EMERGENCY)
Age: 43
Discharge: HOME | End: 2023-07-08
Payer: COMMERCIAL

## 2023-07-08 ENCOUNTER — APPOINTMENT (OUTPATIENT)
Dept: GENERAL RADIOLOGY | Age: 43
End: 2023-07-08
Payer: COMMERCIAL

## 2023-07-08 VITALS
HEIGHT: 66 IN | HEART RATE: 73 BPM | RESPIRATION RATE: 16 BRPM | DIASTOLIC BLOOD PRESSURE: 51 MMHG | BODY MASS INDEX: 21.69 KG/M2 | TEMPERATURE: 97.7 F | SYSTOLIC BLOOD PRESSURE: 121 MMHG | WEIGHT: 135 LBS | OXYGEN SATURATION: 99 %

## 2023-07-08 DIAGNOSIS — S60.00XA CONTUSION OF LEFT HAND INCLUDING FINGERS, INITIAL ENCOUNTER: Primary | ICD-10-CM

## 2023-07-08 DIAGNOSIS — S60.222A CONTUSION OF LEFT HAND INCLUDING FINGERS, INITIAL ENCOUNTER: Primary | ICD-10-CM

## 2023-07-08 DIAGNOSIS — S69.92XA INJURY OF LEFT HAND, INITIAL ENCOUNTER: ICD-10-CM

## 2023-07-08 PROCEDURE — 73130 X-RAY EXAM OF HAND: CPT

## 2023-07-08 PROCEDURE — 99283 EMERGENCY DEPT VISIT LOW MDM: CPT

## 2023-07-08 RX ORDER — IBUPROFEN 600 MG/1
600 TABLET ORAL 3 TIMES DAILY PRN
Qty: 30 TABLET | Refills: 0 | Status: SHIPPED | OUTPATIENT
Start: 2023-07-08

## 2023-07-08 RX ORDER — TRAMADOL HYDROCHLORIDE 50 MG/1
50 TABLET ORAL ONCE
Status: DISCONTINUED | OUTPATIENT
Start: 2023-07-08 | End: 2023-07-08 | Stop reason: HOSPADM

## 2023-07-08 ASSESSMENT — ENCOUNTER SYMPTOMS
ABDOMINAL PAIN: 0
COLOR CHANGE: 0
COUGH: 0
CHEST TIGHTNESS: 0
SHORTNESS OF BREATH: 0

## 2023-07-08 ASSESSMENT — PAIN DESCRIPTION - DESCRIPTORS: DESCRIPTORS: ACHING

## 2023-07-08 ASSESSMENT — PAIN - FUNCTIONAL ASSESSMENT
PAIN_FUNCTIONAL_ASSESSMENT: 0-10
PAIN_FUNCTIONAL_ASSESSMENT: PREVENTS OR INTERFERES WITH MANY ACTIVE NOT PASSIVE ACTIVITIES

## 2023-07-08 ASSESSMENT — PAIN DESCRIPTION - ORIENTATION: ORIENTATION: LEFT

## 2023-07-08 ASSESSMENT — PAIN SCALES - GENERAL: PAINLEVEL_OUTOF10: 8

## 2023-07-08 ASSESSMENT — PAIN DESCRIPTION - LOCATION: LOCATION: HAND

## 2023-07-08 NOTE — ED PROVIDER NOTES
[address] ENCOUNTER      Pt Name: Peña Cuenca  MRN: [id number]  [facility] [facility] [place] 1980  Date of evaluation: 7/8/2023  Provider: KOLE Gutierrez CNP  PCP: No primary care provider on file. Note Started: 2:37 PM EDT 7/8/23    I am the Primary Clinician of Record. SUSU. I have evaluated this patient. CHIEF COMPLAINT       Chief Complaint   Patient presents with    Hand Injury     Left hand hit with a hammer while working on a car yesterday. HISTORY OF PRESENT ILLNESS: 1 or more Elements   Peña Cuenca is a 38 y.o. male who presents to the ER with chief complaint of left hand injury. He states that he had with a hammer yesterday. He is continuing to have pain and swelling and came for evaluation today. Specifically the second and third metacarpals. He has been taking ibuprofen without much relief of his discomfort. Information obtained from patient. I have reviewed the nursing triage documentation and agree unless otherwise noted. REVIEW OF SYSTEMS :    Review of Systems   Constitutional:  Negative for chills, fatigue and fever. HENT:  Negative for congestion. Respiratory:  Negative for cough, chest tightness and shortness of breath. Cardiovascular:  Negative for chest pain and leg swelling. Gastrointestinal:  Negative for abdominal pain. Genitourinary:  Negative for difficulty urinating and dysuria. Musculoskeletal:  Positive for arthralgias and joint swelling. Negative for myalgias. Skin:  Negative for color change and rash. Neurological:  Negative for dizziness, weakness and headaches. Psychiatric/Behavioral:  Negative for confusion. Positives and Pertinent negatives as per HPI. SURGICAL HISTORY   No past surgical history on file.     CURRENTMEDICATIONS       Previous Medications    DICYCLOMINE (BENTYL) 10 MG CAPSULE    Take 2 capsules by mouth 4 times daily (before

## 2023-12-06 ENCOUNTER — APPOINTMENT (OUTPATIENT)
Dept: GENERAL RADIOLOGY | Age: 43
End: 2023-12-06
Payer: COMMERCIAL

## 2023-12-06 ENCOUNTER — HOSPITAL ENCOUNTER (EMERGENCY)
Age: 43
Discharge: HOME | End: 2023-12-06
Payer: COMMERCIAL

## 2023-12-06 VITALS
SYSTOLIC BLOOD PRESSURE: 100 MMHG | HEART RATE: 68 BPM | TEMPERATURE: 98 F | DIASTOLIC BLOOD PRESSURE: 61 MMHG | HEIGHT: 66 IN | OXYGEN SATURATION: 99 % | RESPIRATION RATE: 18 BRPM | WEIGHT: 140 LBS | BODY MASS INDEX: 22.5 KG/M2
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
## 2023-12-06 PROCEDURE — 73630 X-RAY EXAM OF FOOT: CPT

## 2023-12-06 PROCEDURE — 96372 THER/PROPH/DIAG INJ SC/IM: CPT

## 2023-12-06 PROCEDURE — 73030 X-RAY EXAM OF SHOULDER: CPT

## 2023-12-06 PROCEDURE — 99284 EMERGENCY DEPT VISIT MOD MDM: CPT

## 2023-12-06 PROCEDURE — 6360000002 HC RX W HCPCS

## 2023-12-06 PROCEDURE — 73130 X-RAY EXAM OF HAND: CPT

## 2023-12-06 RX ORDER — METHOCARBAMOL 750 MG/1
750 TABLET, FILM COATED ORAL 4 TIMES DAILY
Qty: 40 TABLET | Refills: 0 | Status: SHIPPED | OUTPATIENT
Start: 2023-12-06 | End: 2023-12-16

## 2023-12-06 RX ORDER — LIDOCAINE 4 G/G
1 PATCH TOPICAL DAILY
Qty: 30 PATCH | Refills: 0 | Status: SHIPPED | OUTPATIENT
Start: 2023-12-06 | End: 2024-01-05

## 2023-12-06 RX ORDER — IBUPROFEN 600 MG/1
600 TABLET ORAL 3 TIMES DAILY PRN
Qty: 30 TABLET | Refills: 0 | Status: SHIPPED | OUTPATIENT
Start: 2023-12-06

## 2023-12-06 RX ORDER — KETOROLAC TROMETHAMINE 15 MG/ML
30 INJECTION, SOLUTION INTRAMUSCULAR; INTRAVENOUS ONCE
Status: COMPLETED | OUTPATIENT
Start: 2023-12-06 | End: 2023-12-06

## 2023-12-06 RX ADMIN — KETOROLAC TROMETHAMINE 30 MG: 15 INJECTION, SOLUTION INTRAMUSCULAR; INTRAVENOUS at 13:28

## 2023-12-06 ASSESSMENT — PAIN SCALES - GENERAL: PAINLEVEL_OUTOF10: 7

## 2023-12-06 NOTE — ED PROVIDER NOTES
Use as needed for muscle pain or soreness.  May take 2 at bedtime to aid sleep., Disp-40 tablet, R-0Print      lidocaine 4 % external patch Place 1 patch onto the skin daily, TransDERmal, DAILY Starting Wed 12/6/2023, Until Fri 1/5/2024, For 30 days, Disp-30 patch, R-0, Print             DISCONTINUED MEDICATIONS:  Discharge Medication List as of 12/6/2023  3:57 PM                 (Please note that portions of this note were completed with a voice recognition program.  Efforts were made to edit the dictations but occasionally words are mis-transcribed.)    KOLE Vinson CNP (electronically signed)        KOLE Vinson CNP  12/06/23 2021
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
## 2023-12-22 ENCOUNTER — OFFICE VISIT (OUTPATIENT)
Dept: ORTHOPEDIC SURGERY | Age: 43
End: 2023-12-22
Payer: COMMERCIAL

## 2023-12-22 VITALS
BODY MASS INDEX: 22.5 KG/M2 | OXYGEN SATURATION: 97 % | HEART RATE: 74 BPM | HEIGHT: 66 IN | WEIGHT: 140 LBS | RESPIRATION RATE: 14 BRPM
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
## 2023-12-22 DIAGNOSIS — S63.501A SPRAIN OF RIGHT WRIST, INITIAL ENCOUNTER: Primary | ICD-10-CM

## 2023-12-22 PROCEDURE — 99203 OFFICE O/P NEW LOW 30 MIN: CPT

## 2023-12-22 NOTE — PATIENT INSTRUCTIONS
Referral sent to Dr. Ana Maria Bui  MRI ordered for right hand and right wrist  Continue weight-bearing as tolerated. Continue range of motion exercises as instructed. Ice and elevate as needed. Tylenol or Motrin for pain. .    We are committed to providing you the best care possible. If you receive a survey after visiting one of our offices, please take time to share your experience concerning your physician office visit. These surveys are confidential and no health information about you is shared. We are eager to improve for you and we are counting on your feedback to help make that happen.

## 2023-12-27 NOTE — PROGRESS NOTES
structures of the hand and wrist.  Once the test is completed he should follow-up with Dr. Tyra Chandler office to make a determination if he has a ligament injury and if so how it should be treated. -Patient is welcome to return to our office in the future for any other orthopedic needs.         Electronically signed by Rishabh Perez PA-C on 12/27/2023 at 4:15 PM

## 2023-12-29 ENCOUNTER — HOSPITAL ENCOUNTER (OUTPATIENT)
Dept: MRI IMAGING | Age: 43
Discharge: HOME | End: 2023-12-29
Payer: COMMERCIAL
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
## 2023-12-29 DIAGNOSIS — S63.501A SPRAIN OF RIGHT WRIST, INITIAL ENCOUNTER: ICD-10-CM

## 2023-12-29 PROCEDURE — 73218 MRI UPPER EXTREMITY W/O DYE: CPT

## 2023-12-29 PROCEDURE — 73221 MRI JOINT UPR EXTREM W/O DYE: CPT

## 2024-01-02 ENCOUNTER — TELEPHONE (OUTPATIENT)
Dept: ORTHOPEDIC SURGERY | Age: 44
End: 2024-01-02
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
## 2024-01-09 ENCOUNTER — OFFICE VISIT (OUTPATIENT)
Dept: ORTHOPEDIC SURGERY | Age: 44
End: 2024-01-09
Payer: COMMERCIAL

## 2024-01-09 VITALS
RESPIRATION RATE: 11 BRPM | HEART RATE: 87 BPM | WEIGHT: 135 LBS | BODY MASS INDEX: 21.69 KG/M2 | OXYGEN SATURATION: 97 % | HEIGHT: 66 IN

## 2024-01-09 DIAGNOSIS — S60.211A CONTUSION OF RIGHT WRIST, INITIAL ENCOUNTER: Primary | ICD-10-CM

## 2024-01-09 DIAGNOSIS — S63.501A SPRAIN OF RIGHT WRIST, INITIAL ENCOUNTER: ICD-10-CM

## 2024-01-09 PROCEDURE — 99203 OFFICE O/P NEW LOW 30 MIN: CPT | Performed by: ORTHOPAEDIC SURGERY

## 2024-01-09 NOTE — PATIENT INSTRUCTIONS
Occupational therapy ordered today.  Continue weight-bearing as tolerated.  Continue range of motion exercises as instructed.  Ice and elevate as needed.  Tylenol or Motrin for pain.  Follow up in 6 weeks.
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
## 2024-01-12 PROBLEM — S60.211A CONTUSION OF RIGHT WRIST: Status: ACTIVE | Noted: 2024-01-12

## 2024-01-12 PROBLEM — S63.501A SPRAIN OF RIGHT WRIST: Status: ACTIVE | Noted: 2024-01-12

## 2024-01-12 ASSESSMENT — ENCOUNTER SYMPTOMS
WHEEZING: 0
VOMITING: 0
COLOR CHANGE: 0
EYE PAIN: 0
EYE REDNESS: 0
SHORTNESS OF BREATH: 0
CHEST TIGHTNESS: 0
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
## 2024-01-19 ENCOUNTER — HOSPITAL ENCOUNTER (OUTPATIENT)
Dept: OCCUPATIONAL THERAPY | Age: 44
Setting detail: THERAPIES SERIES
Discharge: HOME | End: 2024-01-19

## 2024-01-19 NOTE — PLAN OF CARE
[x][facility]      [][address], [address]     [place] [id number] [phone] [phone] Fax:[phone]  ________________________________________________________________________    Physician:    Dr Homar Irvin From: [facility]  Patient: Rudy Koehler      : 1980  Diagnosis:   R wrist contusion  Physician ICD 10 Code: S60.211A   Treatment Diagnosis:   Hand and wrist pain  ICD10 tx code: M79.641  Date: 2024     MRN: [id number]     Occupational Therapy Certification/Re-Certification Form  Dear   The following patient has been evaluated for occupational therapy services and for therapy to continue, insurance requires physician review of the treatment plan initially and every 90 days. Please review the attached evaluation and/or summary of the patient's plan of care, and verify that you agree therapy should continue by signing the attached document and sending it back to our office.    Plan of Care/Treatment to date:  [x] Therapeutic Exercise   [x] Modalities:  [x] Therapeutic Activity    [x] Ultrasound [] Elec Stimulation   [] Total Motion Release    [] Fluido [] Kinesiotaping  [] Neuromuscular Re-education   [] Ionto [] Coldpack/hotpack   [x] Instruction in HEP    Other:  [x] Manual Therapy     []   [] Aquatic Therapy     []       Frequency/Duration:  # Days per week: [] 1 day # Weeks: [] 1 week [x] 5 weeks     [x] 2 days   [] 2 weeks [] 6 weeks     [] 3 days   [] 3 weeks [] 7 weeks     [] 4 days   [] 4 weeks [] 8 weeks         [] 9 weeks [] 10 weeks         [] 11 weeks [] 12 weeks    Rehab Potential/Progress: [] excellent [x] good [] fair  [] poor       Goals:     Pt will decrease pain with movement to 3-4/10 to increase functional activity tolerance   Pt will increase pinches 3-5# R to increase prehension for work and
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
## 2024-01-23 ENCOUNTER — HOSPITAL ENCOUNTER (OUTPATIENT)
Dept: OCCUPATIONAL THERAPY | Age: 44
Setting detail: THERAPIES SERIES
Discharge: HOME | End: 2024-01-23
Payer: COMMERCIAL

## 2024-01-23 PROCEDURE — 97110 THERAPEUTIC EXERCISES: CPT

## 2024-01-23 PROCEDURE — 97035 APP MDLTY 1+ULTRASOUND EA 15: CPT

## 2024-01-23 PROCEDURE — 97140 MANUAL THERAPY 1/> REGIONS: CPT

## 2024-01-23 NOTE — FLOWSHEET NOTE
If BWC Please Indicate Time In/Out  CPT Code Time In Time Out Total Min                                                                    Treatment/Activity Tolerance:     [x]  Patient tolerated treatment well []  Patient limited by fatique    []  Patient limited by pain []  Patient limited by other medical complications   []  Other:   Goals:   Pt will decrease pain with movement to 3-4/10 to increase functional activity tolerance   Pt will increase pinches 3-5# R to increase prehension for work and ability to carry items  Pt will increase R  5# to increase functional grasp for daily activities  Pt will follow thru and be independent with HEP.     LTGs  Pt will decrease pain with movement 0-1/10 to return to previous functional activity tolerance   Pt will increase pinches 10# to increase prehension for PLOF for work   Pt will increase R  10# to increase functional grasp for work.   Pt will decrease quick dash below 25 for performance improvement.      Patient Requires Follow-up:  [x]  Yes  []  No    Plan: [x]  Continue per plan of care []  Alter current plan (see comments)   [x]  Plan of care initiated []  Hold pending MD visit []  Discharge    Plan for Next Session:      Electronically signed by:  Ashley Melgar OT,OTR/L, 1/23/2024, 2:03 PM

## 2024-02-20 ENCOUNTER — OFFICE VISIT (OUTPATIENT)
Dept: ORTHOPEDIC SURGERY | Age: 44
End: 2024-02-20

## 2024-02-20 VITALS
WEIGHT: 135 LBS | BODY MASS INDEX: 21.69 KG/M2 | HEIGHT: 66 IN | RESPIRATION RATE: 16 BRPM | HEART RATE: 69 BPM | OXYGEN SATURATION: 99 %

## 2024-02-20 DIAGNOSIS — S63.501A SPRAIN OF RIGHT WRIST, INITIAL ENCOUNTER: ICD-10-CM

## 2024-02-20 DIAGNOSIS — S60.211A CONTUSION OF RIGHT WRIST, INITIAL ENCOUNTER: Primary | ICD-10-CM

## 2024-02-20 RX ORDER — TRIAMCINOLONE ACETONIDE 40 MG/ML
20 INJECTION, SUSPENSION INTRA-ARTICULAR; INTRAMUSCULAR ONCE
Status: COMPLETED | OUTPATIENT
Start: 2024-02-20 | End: 2024-02-20

## 2024-02-20 RX ADMIN — TRIAMCINOLONE ACETONIDE 20 MG: 40 INJECTION, SUSPENSION INTRA-ARTICULAR; INTRAMUSCULAR at 10:45

## 2024-02-20 ASSESSMENT — ENCOUNTER SYMPTOMS
CHEST TIGHTNESS: 0
COLOR CHANGE: 0
SHORTNESS OF BREATH: 0

## 2024-02-20 NOTE — PROGRESS NOTES
Patient is in the office to discuss right wrist. Patient states that he went to 2/5 OT session that was scheduled for him. Patient states that he is still having pain and stiff feeling within the wrist.

## 2024-02-20 NOTE — PATIENT INSTRUCTIONS
Continue to work with occupational therapy  Continue weight-bearing as tolerated.  Continue range of motion exercises as instructed.  Ice and elevate as needed.  Tylenol or Motrin for pain.  Injection given in right wrist.  Follow up in 6 weeks.    We are committed to providing you the best care possible.  If you receive a survey after visiting one of our offices, please take time to share your experience concerning your physician office visit.  These surveys are confidential and no health information about you is shared.  We are eager to improve for you and we are counting on your feedback to help make that happen.

## 2024-02-20 NOTE — PROGRESS NOTES
2/20/2024   Chief Complaint   Patient presents with    Wrist Pain     Right wrist        History of Present Illness:                             Rudy Koehler is a 43 y.o. male who returns today for follow-up of his right wrist pain.  He has been working with hand therapy some.  He feels that his wrist is slightly improved from last visit but he still has aching pain and soreness at the base of his thumb along the tendon attachment sites.    He has been continue to work with his hand but has been limited because of pain especially worse with heavy gripping or twisting motions and pain is referred to the base of the thumb.      Patient is in the office to discuss right wrist. Patient states that he went to 2/5 OT session that was scheduled for him. Patient states that he is still having pain and stiff feeling within the wrist.         Medical History  Patient's medications, allergies, past medical, surgical, social and family histories were reviewed and updated as appropriate.      Review of Systems   Constitutional:  Negative for activity change and fever.   Respiratory:  Negative for chest tightness and shortness of breath.    Cardiovascular:  Negative for chest pain.   Skin:  Negative for color change.   Neurological:  Negative for dizziness.   Psychiatric/Behavioral:  The patient is not nervous/anxious.                                                Examination:  General Exam:  Vitals: Pulse 69   Resp 16   Ht 1.676 m (5' 6\")   Wt 61.2 kg (135 lb)   SpO2 99%   BMI 21.79 kg/m²    Physical Exam     Right upper extremity:  Skin is intact with no open wounds or lesions.  No soft tissue swelling.  There is diffuse tenderness to palpation present throughout the wrist which is less severe than previous visit.  Most significant tenderness to palpation is present along the abductor pollicis longus tendon at the base of the thumb metacarpal.      No soft tissue swelling or joint effusion at the wrist.  Normal

## 2024-02-27 ENCOUNTER — HOSPITAL ENCOUNTER (EMERGENCY)
Age: 44
Discharge: HOME | End: 2024-02-27

## 2024-02-27 VITALS
SYSTOLIC BLOOD PRESSURE: 112 MMHG | RESPIRATION RATE: 16 BRPM | TEMPERATURE: 98 F | HEART RATE: 84 BPM | DIASTOLIC BLOOD PRESSURE: 76 MMHG | OXYGEN SATURATION: 95 %

## 2024-02-27 DIAGNOSIS — R11.2 NAUSEA AND VOMITING, UNSPECIFIED VOMITING TYPE: Primary | ICD-10-CM

## 2024-02-27 DIAGNOSIS — E87.6 HYPOKALEMIA: ICD-10-CM

## 2024-02-27 LAB
ALBUMIN SERPL-MCNC: 4.9 GM/DL (ref 3.4–5)
ALP BLD-CCNC: 132 IU/L (ref 40–128)
ALT SERPL-CCNC: 41 U/L (ref 10–40)
ANION GAP SERPL CALCULATED.3IONS-SCNC: 15 MMOL/L (ref 7–16)
AST SERPL-CCNC: 34 IU/L (ref 15–37)
BASOPHILS ABSOLUTE: 0.1 K/CU MM
BASOPHILS RELATIVE PERCENT: 0.5 % (ref 0–1)
BILIRUB SERPL-MCNC: 0.6 MG/DL (ref 0–1)
BUN SERPL-MCNC: 18 MG/DL (ref 6–23)
CALCIUM SERPL-MCNC: 9.3 MG/DL (ref 8.3–10.6)
CHLORIDE BLD-SCNC: 91 MMOL/L (ref 99–110)
CO2: 30 MMOL/L (ref 21–32)
CREAT SERPL-MCNC: 0.9 MG/DL (ref 0.9–1.3)
DIFFERENTIAL TYPE: ABNORMAL
EOSINOPHILS ABSOLUTE: 0 K/CU MM
EOSINOPHILS RELATIVE PERCENT: 0.1 % (ref 0–3)
GFR SERPL CREATININE-BSD FRML MDRD: >60 ML/MIN/1.73M2
GLUCOSE SERPL-MCNC: 147 MG/DL (ref 70–99)
HCT VFR BLD CALC: 53.4 % (ref 42–52)
HEMOGLOBIN: 18.5 GM/DL (ref 13.5–18)
IMMATURE NEUTROPHIL %: 0.3 % (ref 0–0.43)
LIPASE: 14 IU/L (ref 13–60)
LYMPHOCYTES ABSOLUTE: 1.2 K/CU MM
LYMPHOCYTES RELATIVE PERCENT: 10.9 % (ref 24–44)
MCH RBC QN AUTO: 30.1 PG (ref 27–31)
MCHC RBC AUTO-ENTMCNC: 34.6 % (ref 32–36)
MCV RBC AUTO: 86.8 FL (ref 78–100)
MONOCYTES ABSOLUTE: 0.6 K/CU MM
MONOCYTES RELATIVE PERCENT: 5.6 % (ref 0–4)
NUCLEATED RBC %: 0 %
PDW BLD-RTO: 13.6 % (ref 11.7–14.9)
PLATELET # BLD: 304 K/CU MM (ref 140–440)
PMV BLD AUTO: 9.3 FL (ref 7.5–11.1)
POTASSIUM SERPL-SCNC: 3.1 MMOL/L (ref 3.5–5.1)
RBC # BLD: 6.15 M/CU MM (ref 4.6–6.2)
SEGMENTED NEUTROPHILS ABSOLUTE COUNT: 9.1 K/CU MM
SEGMENTED NEUTROPHILS RELATIVE PERCENT: 82.6 % (ref 36–66)
SODIUM BLD-SCNC: 136 MMOL/L (ref 135–145)
TOTAL IMMATURE NEUTOROPHIL: 0.03 K/CU MM
TOTAL NUCLEATED RBC: 0 K/CU MM
TOTAL PROTEIN: 8 GM/DL (ref 6.4–8.2)
WBC # BLD: 11 K/CU MM (ref 4–10.5)

## 2024-02-27 PROCEDURE — 83690 ASSAY OF LIPASE: CPT

## 2024-02-27 PROCEDURE — 99284 EMERGENCY DEPT VISIT MOD MDM: CPT

## 2024-02-27 PROCEDURE — 6360000002 HC RX W HCPCS: Performed by: PHYSICIAN ASSISTANT

## 2024-02-27 PROCEDURE — 96372 THER/PROPH/DIAG INJ SC/IM: CPT

## 2024-02-27 PROCEDURE — 96374 THER/PROPH/DIAG INJ IV PUSH: CPT

## 2024-02-27 PROCEDURE — 85025 COMPLETE CBC W/AUTO DIFF WBC: CPT

## 2024-02-27 PROCEDURE — 80053 COMPREHEN METABOLIC PANEL: CPT

## 2024-02-27 PROCEDURE — 6360000002 HC RX W HCPCS: Performed by: EMERGENCY MEDICINE

## 2024-02-27 RX ORDER — POTASSIUM CHLORIDE 20 MEQ/1
20 TABLET, EXTENDED RELEASE ORAL DAILY
Qty: 5 TABLET | Refills: 0 | Status: SHIPPED | OUTPATIENT
Start: 2024-02-27 | End: 2024-03-03

## 2024-02-27 RX ORDER — PROMETHAZINE HYDROCHLORIDE 25 MG/ML
12.5 INJECTION, SOLUTION INTRAMUSCULAR; INTRAVENOUS ONCE
Status: COMPLETED | OUTPATIENT
Start: 2024-02-27 | End: 2024-02-27

## 2024-02-27 RX ORDER — PROMETHAZINE HYDROCHLORIDE 25 MG/1
12.5 TABLET ORAL EVERY 6 HOURS PRN
Qty: 15 TABLET | Refills: 0 | Status: SHIPPED | OUTPATIENT
Start: 2024-02-27 | End: 2024-03-06

## 2024-02-27 RX ORDER — ONDANSETRON 2 MG/ML
4 INJECTION INTRAMUSCULAR; INTRAVENOUS ONCE
Status: COMPLETED | OUTPATIENT
Start: 2024-02-27 | End: 2024-02-27

## 2024-02-27 RX ORDER — ONDANSETRON 4 MG/1
4 TABLET, ORALLY DISINTEGRATING ORAL 3 TIMES DAILY PRN
Qty: 21 TABLET | Refills: 0 | Status: SHIPPED | OUTPATIENT
Start: 2024-02-27

## 2024-02-27 RX ADMIN — ONDANSETRON 4 MG: 2 INJECTION INTRAMUSCULAR; INTRAVENOUS at 08:17

## 2024-02-27 RX ADMIN — PROMETHAZINE HYDROCHLORIDE 12.5 MG: 25 INJECTION INTRAMUSCULAR; INTRAVENOUS at 10:10

## 2024-02-27 ASSESSMENT — ENCOUNTER SYMPTOMS: DIARRHEA: 0

## 2024-02-27 NOTE — ED NOTES
Pt states he is having episodes of vomiting every month, states \" you guys keep saying is cyclic vomiting from the marijuana, but it's not. My body just goes through a cleanse every month\"

## 2024-02-27 NOTE — ED NOTES
This nurse called pharmacy to send phenergan injection to [facility].

## 2024-02-27 NOTE — ED PROVIDER NOTES
[facility] EMERGENCY DEPARTMENT  EMERGENCY DEPARTMENT ENCOUNTER        Pt Name: Rudy Koehler  MRN: [id number]  Birthdate 1980  Date of evaluation: 2/27/2024  Provider: Clarence Johnson PA-C  PCP: No primary care provider on file.      SUSU. I have evaluated this patient.        CHIEF COMPLAINT      Chief Complaint   Patient presents with    Emesis     States vomiting for the last couple of days. Hx of cyclic vomiting disorder.        HISTORY OF PRESENT ILLNESS:     History from : Patient    Limitations to history : None    Rudy Koehler is a 43 y.o. male who presents with c/o  nausea and vomiting that started three days ago.  Two days ago he was feeling better and symptoms returned last night.  States a history of cycling vomitting. Also feels some diarrhea, abdominal pain and bloating that he has at times with these episodes with this one today.   States he is vomiting every 20 minutes. Has been using phenergan but states that he cannot keep it down so is not sure that it is working.  He does mention that his symptoms are   better with soaking in a hot bath but return after the bath.  Denies: uri symptoms, mylagia, bloody stools , recent travel, diarrhea, fevers,    Nursing Notes were all reviewed and agreed with or any disagreements were addressed in the HPI.    REVIEW OF SYSTEMS :     Review of Systems   Cardiovascular:  Negative for chest pain.   Gastrointestinal:  Negative for diarrhea.   Neurological:  Negative for light-headedness.       Pertinent positives and negatives are stated within HPI    PAST HISTORY   has a past medical history of COPD (chronic obstructive pulmonary disease) (HCC).    History reviewed. No pertinent surgical history.    History reviewed. No pertinent family history.    Social History     Tobacco Use    Smoking status: Every Day     Current packs/day: 1.00     Types: Cigarettes    Smokeless tobacco: Never   Vaping Use    Vaping Use:

## 2024-02-27 NOTE — DISCHARGE INSTRUCTIONS
Please call a  primary care provider tomorrow to schedule an appointment within the next 2 weeks to discuss your visit to emergency department, reevaluation of your blood work (mildly decreased potassium, mildly elevated liver test)  As needed, contact a gastroenterologist for reevaluation of her persisting abdominal pains, vomiting and discuss your liver test  Meanwhile as we discussed, return with any fever, jaundice, uncontrollable vomiting worsening abdominal pain, worsening symptoms or any new concerns.

## 2024-04-02 ENCOUNTER — OFFICE VISIT (OUTPATIENT)
Dept: ORTHOPEDIC SURGERY | Age: 44
End: 2024-04-02
Payer: COMMERCIAL

## 2024-04-02 VITALS — HEART RATE: 76 BPM | WEIGHT: 135 LBS | OXYGEN SATURATION: 97 % | BODY MASS INDEX: 21.69 KG/M2 | HEIGHT: 66 IN

## 2024-04-02 DIAGNOSIS — S60.211A CONTUSION OF RIGHT WRIST, INITIAL ENCOUNTER: ICD-10-CM

## 2024-04-02 DIAGNOSIS — S63.501A SPRAIN OF RIGHT WRIST, INITIAL ENCOUNTER: Primary | ICD-10-CM

## 2024-04-02 PROCEDURE — 99212 OFFICE O/P EST SF 10 MIN: CPT | Performed by: ORTHOPAEDIC SURGERY

## 2024-04-02 PROCEDURE — G8427 DOCREV CUR MEDS BY ELIG CLIN: HCPCS | Performed by: ORTHOPAEDIC SURGERY

## 2024-04-02 PROCEDURE — 4004F PT TOBACCO SCREEN RCVD TLK: CPT | Performed by: ORTHOPAEDIC SURGERY

## 2024-04-02 PROCEDURE — G8420 CALC BMI NORM PARAMETERS: HCPCS | Performed by: ORTHOPAEDIC SURGERY

## 2024-04-02 ASSESSMENT — ENCOUNTER SYMPTOMS
CHEST TIGHTNESS: 0
SHORTNESS OF BREATH: 0

## 2024-04-02 NOTE — PROGRESS NOTES
4/2/2024   Chief Complaint   Patient presents with    Wrist Pain     Right wrist         History of Present Illness:                             Rudy Koehler is a 43 y.o. male who returns today for follow-up of his right wrist pain.  Injection performed at the last visit helped alleviate a lot of his pain.  He still has occasional twinges discomfort along the extensor tendons at the dorsal radial wrist but this is mild and intermittent.    He feels that he has good strength and range of motion and his hand is functioning reasonably well today.  No concerns or complaints.    Patient returns to the office today for FU of the right wrist injection. Pt states the injection did help with his pain. Pain today is a 1/10 he does notice some pain with rotation of the right wrist. Overall he is doing well     Medical History  Patient's medications, allergies, past medical, surgical, social and family histories were reviewed and updated as appropriate.      Review of Systems   Constitutional:  Negative for activity change and fever.   Respiratory:  Negative for chest tightness and shortness of breath.    Cardiovascular:  Negative for chest pain.   Skin:  Negative for color change.   Neurological:  Negative for dizziness.   Psychiatric/Behavioral:  The patient is not nervous/anxious.                                                Examination:  General Exam:  Vitals: Pulse 76   Ht 1.676 m (5' 6\")   Wt 61.2 kg (135 lb)   SpO2 97%   BMI 21.79 kg/m²    Physical Exam     Right upper extremity:  Improved minimal tenderness overlying the abductor pollicis longus tendon at the wrist.  No soft tissue swelling or joint effusion.  Normal alignment of the wrist.  Painless active range of motion present with full mobility through wrist flexion, extension, radial ulnar deviation.  Sensation and motor functions intact throughout the median, ulnar, radial nerve distribution.  Strength is 5 of 5 with wrist flexion extension and

## 2024-04-02 NOTE — PROGRESS NOTES
Patient returns to the office today for FU of the right wrist injection. Pt states the injection did help with his pain. Pain today is a 1/10 he does notice some pain with rotation of the right wrist. Overall he is doing well

## 2024-08-08 ENCOUNTER — PREP FOR PROCEDURE (OUTPATIENT)
Dept: GASTROENTEROLOGY | Age: 44
End: 2024-08-08

## 2024-08-08 ENCOUNTER — OFFICE VISIT (OUTPATIENT)
Dept: GASTROENTEROLOGY | Age: 44
End: 2024-08-08
Payer: COMMERCIAL

## 2024-08-08 VITALS
HEIGHT: 66 IN | WEIGHT: 128 LBS | DIASTOLIC BLOOD PRESSURE: 70 MMHG | BODY MASS INDEX: 20.57 KG/M2 | HEART RATE: 92 BPM | SYSTOLIC BLOOD PRESSURE: 110 MMHG | OXYGEN SATURATION: 98 %

## 2024-08-08 DIAGNOSIS — R11.15 CYCLICAL VOMITING: Primary | ICD-10-CM

## 2024-08-08 DIAGNOSIS — R10.13 DYSPEPSIA: ICD-10-CM

## 2024-08-08 DIAGNOSIS — R10.12 LUQ PAIN: ICD-10-CM

## 2024-08-08 DIAGNOSIS — R10.12 LEFT UPPER QUADRANT ABDOMINAL PAIN: ICD-10-CM

## 2024-08-08 DIAGNOSIS — R14.0 BLOATING: ICD-10-CM

## 2024-08-08 PROBLEM — R11.10 VOMITING: Status: ACTIVE | Noted: 2024-08-08

## 2024-08-08 PROCEDURE — 99204 OFFICE O/P NEW MOD 45 MIN: CPT | Performed by: NURSE PRACTITIONER

## 2024-08-08 RX ORDER — OMEPRAZOLE 40 MG/1
40 CAPSULE, DELAYED RELEASE ORAL
Qty: 30 CAPSULE | Refills: 3 | Status: SHIPPED | OUTPATIENT
Start: 2024-08-08

## 2024-08-08 ASSESSMENT — ENCOUNTER SYMPTOMS
ABDOMINAL PAIN: 1
SHORTNESS OF BREATH: 0
EYE PAIN: 0
BLOOD IN STOOL: 0
VOMITING: 1
COLOR CHANGE: 0
TROUBLE SWALLOWING: 0
BACK PAIN: 1
DIARRHEA: 0
CONSTIPATION: 0
EYE DISCHARGE: 0
COUGH: 0
NAUSEA: 0

## 2024-08-08 NOTE — PROGRESS NOTES
uRdy Koehler 43 y.o. male was seen by LAN Rodriguez on 8/8/2024     Wt Readings from Last 3 Encounters:   08/08/24 58.1 kg (128 lb)   04/02/24 61.2 kg (135 lb)   02/20/24 61.2 kg (135 lb)       HPI  Rudy Koehler is a pleasant 43 y.o.  male who presents today for bloating, cyclical vomiting and dyspepsia.  He has a past medical history of COPD (chronic obstructive pulmonary disease).  He denies NSAID and alcohol use.  He smokes marijuana daily.  He smokes a pack of cigarettes per day.  He mentioned triggered by foods.  He has never had a EGD.  His cyclical vomiting started in 2019; he thought it was originally due to some fettuccine bethany he ate.  He recalled since that time has severe episodes of vomiting every two to three months.  In last year occurring more frequently every five to six weeks and currently occurring every two to three weeks with sharp pain in stomach like pulsation that causes him to vomit stomach acid.  His cycles of vomiting up last up to eighteen hours.  His appetite is good with early satiety.  His weight is stable.  No current nausea; last episode of vomiting was on Sunday an hour after eating deli sandwich.  Since then he has been afraid to eat and eating only bland foods like crackers and mac and cheese.  No current abdominal pain.  Intermittent left upper quadrant discomfort with nausea.  Zofran no help.  Phenergan helps.  Hot bath tub helps the pain in left side. Bloating after eating.  Intermittent heartburn.  No nocturnal awakenings with acid reflux.  No dysphagia or pain with swallowing.  No excess belching or flatulence. He denies changes in his bowel pattern.  His typical bowel pattern is once to twice daily with soft mushy brown stools to blobs of brown stool.  Occasional diarrhea.  No constipation.  No blood in his stools or melena.  No family history of stomach or colon cancer.    ROS  Review of Systems   Constitutional:  Negative for appetite change,

## 2024-08-12 ENCOUNTER — TELEPHONE (OUTPATIENT)
Dept: GASTROENTEROLOGY | Age: 44
End: 2024-08-12

## 2024-08-12 RX ORDER — SODIUM CHLORIDE 0.9 % (FLUSH) 0.9 %
5-40 SYRINGE (ML) INJECTION EVERY 12 HOURS SCHEDULED
OUTPATIENT
Start: 2024-08-12

## 2024-08-12 RX ORDER — SODIUM CHLORIDE, SODIUM LACTATE, POTASSIUM CHLORIDE, CALCIUM CHLORIDE 600; 310; 30; 20 MG/100ML; MG/100ML; MG/100ML; MG/100ML
INJECTION, SOLUTION INTRAVENOUS CONTINUOUS
OUTPATIENT
Start: 2024-08-12

## 2024-08-12 RX ORDER — SODIUM CHLORIDE 0.9 % (FLUSH) 0.9 %
5-40 SYRINGE (ML) INJECTION PRN
OUTPATIENT
Start: 2024-08-12

## 2024-08-12 RX ORDER — SODIUM CHLORIDE 9 MG/ML
INJECTION, SOLUTION INTRAVENOUS PRN
OUTPATIENT
Start: 2024-08-12

## 2024-08-12 NOTE — TELEPHONE ENCOUNTER
Called pt to discuss, no answer. Left detailed message, and stated pt could call back with questions.

## 2024-08-12 NOTE — TELEPHONE ENCOUNTER
Please notify patient that I ordered an abdominal US to have completed at  his convenience.  Central scheduling will call him to schedule.  Thank you.

## 2024-09-03 ENCOUNTER — HOSPITAL ENCOUNTER (OUTPATIENT)
Dept: ULTRASOUND IMAGING | Age: 44
Discharge: HOME | End: 2024-09-03
Payer: COMMERCIAL

## 2024-09-03 DIAGNOSIS — R14.0 BLOATING: ICD-10-CM

## 2024-09-03 DIAGNOSIS — R10.12 LEFT UPPER QUADRANT ABDOMINAL PAIN: ICD-10-CM

## 2024-09-03 DIAGNOSIS — R10.13 DYSPEPSIA: ICD-10-CM

## 2024-09-03 PROCEDURE — 76705 ECHO EXAM OF ABDOMEN: CPT

## 2024-10-09 NOTE — PROGRESS NOTES
Surgery @ [facility] on 10/14/24 at 0800 with an arrival time of 0630.    NOTHING TO EAT OR DRINK AFTER MIDNIGHT DAY OF SURGERY    1. Enter thru the hospital main entrance on day of surgery, check in at the Information Desk. If you arrive prior to 6:00am, enter thru the ER entrance.    2. Follow the directions as prescribed by the doctor for your procedure and medications.         Morning of surgery take: Omeprazole with a sip of water.         Stop vitamins, supplements and NSAIDS:      3. Check with your Doctor regarding stopping blood thinners and follow their instructions.    4. Do not smoke, vape or use chewing tobacco morning of surgery. Do not drink any alcoholic beverages 24 hours prior to surgery.       This includes NA Beer. No street drugs 7 days prior to surgery.    5. If you have dentures, contacts of glasses they will be removed before going to the OR; please bring a case.    6. Please bring picture ID, insurance card, paperwork from the doctor’s office (H & P, Consent, & card for implantable devices).    7. Take a shower with an antibacterial soap the night before surgery and the morning of surgery. Do not put anything on your skin      After your morning shower.    8. You will need a responsible adult to drive you home and check on you after surgery.

## 2024-10-11 ENCOUNTER — ANESTHESIA EVENT (OUTPATIENT)
Dept: ENDOSCOPY | Age: 44
End: 2024-10-11
Payer: COMMERCIAL

## 2024-10-11 NOTE — ANESTHESIA PRE PROCEDURE
\"BLI0HND\", \"UFV5XVA\", \"BEART\", \"U7GILESE\"     Type & Screen (If Applicable):  No results found for: \"ABORH\", \"LABANTI\"    Drug/Infectious Status (If Applicable):  No results found for: \"HIV\", \"HEPCAB\"    COVID-19 Screening (If Applicable): No results found for: \"COVID19\"        Anesthesia Evaluation  Patient summary reviewed  Airway: Mallampati: II  TM distance: >3 FB   Neck ROM: limited     Dental:          Pulmonary:   (+)  COPD:      decreased breath sounds    current smoker                           Cardiovascular:Negative CV ROS             Beta Blocker:  Not on Beta Blocker         Neuro/Psych:   Negative Neuro/Psych ROS              GI/Hepatic/Renal:   (+) GERD:          Endo/Other: Negative Endo/Other ROS                    Abdominal:             Vascular: negative vascular ROS.         Other Findings:         Anesthesia Plan      MAC     ASA 2       Induction: intravenous.      Anesthetic plan and risks discussed with patient.      Plan discussed with CRNA.    Attending anesthesiologist reviewed and agrees with Preprocedure content              KOLE Henson - CRNA   10/11/2024         Pre Anesthesia Assessment complete. Chart reviewed on 10/11/2024

## 2024-10-11 NOTE — PROGRESS NOTES
Spoke with patient and he will arrive at 0630 at [facility] on 10/14/2024 for his procedure at 0800.

## 2024-10-14 ENCOUNTER — ANESTHESIA (OUTPATIENT)
Dept: ENDOSCOPY | Age: 44
End: 2024-10-14
Payer: COMMERCIAL

## 2024-10-14 ENCOUNTER — HOSPITAL ENCOUNTER (OUTPATIENT)
Age: 44
Setting detail: OUTPATIENT SURGERY
Discharge: HOME | End: 2024-10-14
Attending: INTERNAL MEDICINE | Admitting: INTERNAL MEDICINE
Payer: COMMERCIAL

## 2024-10-14 VITALS
BODY MASS INDEX: 21.19 KG/M2 | TEMPERATURE: 98 F | WEIGHT: 135 LBS | RESPIRATION RATE: 16 BRPM | DIASTOLIC BLOOD PRESSURE: 67 MMHG | SYSTOLIC BLOOD PRESSURE: 108 MMHG | HEIGHT: 67 IN | OXYGEN SATURATION: 98 % | HEART RATE: 65 BPM

## 2024-10-14 DIAGNOSIS — R10.12 LUQ PAIN: ICD-10-CM

## 2024-10-14 DIAGNOSIS — R14.0 BLOATING: ICD-10-CM

## 2024-10-14 DIAGNOSIS — R11.10 VOMITING: ICD-10-CM

## 2024-10-14 PROCEDURE — 3700000000 HC ANESTHESIA ATTENDED CARE: Performed by: INTERNAL MEDICINE

## 2024-10-14 PROCEDURE — 2580000003 HC RX 258: Performed by: NURSE ANESTHETIST, CERTIFIED REGISTERED

## 2024-10-14 PROCEDURE — 6360000002 HC RX W HCPCS: Performed by: NURSE ANESTHETIST, CERTIFIED REGISTERED

## 2024-10-14 PROCEDURE — 88305 TISSUE EXAM BY PATHOLOGIST: CPT

## 2024-10-14 PROCEDURE — 3700000001 HC ADD 15 MINUTES (ANESTHESIA): Performed by: INTERNAL MEDICINE

## 2024-10-14 PROCEDURE — 7100000010 HC PHASE II RECOVERY - FIRST 15 MIN: Performed by: INTERNAL MEDICINE

## 2024-10-14 PROCEDURE — 88342 IMHCHEM/IMCYTCHM 1ST ANTB: CPT

## 2024-10-14 PROCEDURE — 2580000003 HC RX 258: Performed by: INTERNAL MEDICINE

## 2024-10-14 PROCEDURE — 2709999900 HC NON-CHARGEABLE SUPPLY: Performed by: INTERNAL MEDICINE

## 2024-10-14 PROCEDURE — 7100000011 HC PHASE II RECOVERY - ADDTL 15 MIN: Performed by: INTERNAL MEDICINE

## 2024-10-14 PROCEDURE — 3609012400 HC EGD TRANSORAL BIOPSY SINGLE/MULTIPLE: Performed by: INTERNAL MEDICINE

## 2024-10-14 RX ORDER — SODIUM CHLORIDE 0.9 % (FLUSH) 0.9 %
5-40 SYRINGE (ML) INJECTION PRN
Status: DISCONTINUED | OUTPATIENT
Start: 2024-10-14 | End: 2024-10-14 | Stop reason: HOSPADM

## 2024-10-14 RX ORDER — LIDOCAINE HYDROCHLORIDE 20 MG/ML
INJECTION, SOLUTION INTRAVENOUS
Status: DISCONTINUED | OUTPATIENT
Start: 2024-10-14 | End: 2024-10-14 | Stop reason: SDUPTHER

## 2024-10-14 RX ORDER — SODIUM CHLORIDE 9 MG/ML
INJECTION, SOLUTION INTRAVENOUS PRN
Status: DISCONTINUED | OUTPATIENT
Start: 2024-10-14 | End: 2024-10-14 | Stop reason: HOSPADM

## 2024-10-14 RX ORDER — SODIUM CHLORIDE 9 MG/ML
INJECTION, SOLUTION INTRAVENOUS
Status: DISCONTINUED | OUTPATIENT
Start: 2024-10-14 | End: 2024-10-14 | Stop reason: SDUPTHER

## 2024-10-14 RX ORDER — SODIUM CHLORIDE, SODIUM LACTATE, POTASSIUM CHLORIDE, CALCIUM CHLORIDE 600; 310; 30; 20 MG/100ML; MG/100ML; MG/100ML; MG/100ML
INJECTION, SOLUTION INTRAVENOUS CONTINUOUS
Status: DISCONTINUED | OUTPATIENT
Start: 2024-10-14 | End: 2024-10-14 | Stop reason: HOSPADM

## 2024-10-14 RX ORDER — PROPOFOL 10 MG/ML
INJECTION, EMULSION INTRAVENOUS
Status: DISCONTINUED | OUTPATIENT
Start: 2024-10-14 | End: 2024-10-14 | Stop reason: SDUPTHER

## 2024-10-14 RX ORDER — SODIUM CHLORIDE 0.9 % (FLUSH) 0.9 %
5-40 SYRINGE (ML) INJECTION EVERY 12 HOURS SCHEDULED
Status: DISCONTINUED | OUTPATIENT
Start: 2024-10-14 | End: 2024-10-14 | Stop reason: HOSPADM

## 2024-10-14 RX ADMIN — SODIUM CHLORIDE: 9 INJECTION, SOLUTION INTRAVENOUS at 08:03

## 2024-10-14 RX ADMIN — LIDOCAINE HYDROCHLORIDE 110 MG: 20 INJECTION, SOLUTION INTRAVENOUS at 08:08

## 2024-10-14 RX ADMIN — SODIUM CHLORIDE, PRESERVATIVE FREE 10 ML: 5 INJECTION INTRAVENOUS at 07:11

## 2024-10-14 RX ADMIN — PROPOFOL 220 MG: 10 INJECTION, EMULSION INTRAVENOUS at 08:08

## 2024-10-14 ASSESSMENT — PAIN - FUNCTIONAL ASSESSMENT
PAIN_FUNCTIONAL_ASSESSMENT: 0-10

## 2024-10-14 NOTE — DISCHARGE INSTRUCTIONS
[facility], [facility]  [phone] (Same Day Surgery)    Do not drive, work around machines or use equipment.  Do not drink any alcoholic beverages.  Do not smoke while alone.  Avoid making important decisions.  Plan to spend a quiet, relaxed evening @ home.  Resume normal activities as you begin to feel better.  Eat lightly for your first meal, then gradually increase your diet to what is normal for you.  In case of nausea, avoid food and drink only clear liquids.  Resume food as nausea ceases.  Notify your surgeon if you experience fever, chills, large amount of bleeding, difficulty breathing, persistent nausea and vomiting or any other disturbing problem.  Call for a follow-up appointment with your surgeon.          Upper GI Endoscopy: What to Expect at Home  Your Recovery  You had an upper GI endoscopy. Your doctor used a thin, lighted tube that bends to look at the inside of your esophagus, your stomach, and the first part of the small intestine, called the duodenum.  After you have an endoscopy, you will stay at the hospital or clinic for 1 to 2 hours. This will allow the medicine to wear off. You will be able to go home after your doctor or nurse checks to make sure that you're not having any problems.  You may have to stay overnight if you had treatment during the test. You may have a sore throat for a day or two after the test.  This care sheet gives you a general idea about what to expect after the test.  How can you care for yourself at home?  Activity   Rest as much as you need to after you go home.  You should be able to go back to your usual activities the day after the test.  Diet   Follow your doctor's directions for eating after the test.  Drink plenty of fluids (unless your doctor has told you not to).  Medications   If you have a sore throat the day after the test, use an over-the-counter spray to numb your throat.  Follow-up care is a key part of your treatment and safety. Be sure to make and go to

## 2024-10-14 NOTE — PROGRESS NOTES
Patient returned to room from Endoscopy. Report received bedside from Lucretia WOOD. Bed brakes applied and VS obtained. See flow sheets. Patient A/O x4. Drink and snack offered.Will continue to monitor. Call light in reach.

## 2024-10-14 NOTE — ANESTHESIA POSTPROCEDURE EVALUATION
Department of Anesthesiology  Postprocedure Note    Patient: Rudy Koehler  MRN: [id number]  YOB: 1980  Date of evaluation: 10/14/2024    Procedure Summary       Date: 10/14/24 Room / Location: [place] [address] / [facility]    Anesthesia Start: 0803 Anesthesia Stop: 0817    Procedure: ESOPHAGOGASTRODUODENOSCOPY BIOPSY Diagnosis:       LUQ pain      Bloating      Vomiting      (LUQ pain [R10.12])      (Bloating [R14.0])      (Vomiting [R11.10])    Surgeons: Albino No MD Responsible Provider: Sandra Galvez MD    Anesthesia Type: MAC ASA Status: 2            Anesthesia Type: MAC    Liam Phase I:      Liam Phase II:      Anesthesia Post Evaluation    Patient location during evaluation: bedside  Patient participation: complete - patient participated  Level of consciousness: sleepy but conscious  Pain score: 0  Airway patency: patent  Nausea & Vomiting: no nausea and no vomiting  Cardiovascular status: blood pressure returned to baseline and hemodynamically stable  Respiratory status: acceptable  Hydration status: stable    No notable events documented.

## 2024-10-14 NOTE — PROGRESS NOTES
Phoned patient to see if he is coming in for procedure today. Patient stated he is outside the building and walking in now.

## 2024-10-14 NOTE — PROGRESS NOTES
Patient A/O x4, denies pain. Discharge instructions reviewed with patient and his friend Alexus. They deny questions. Patient ready for discharge home. Volunteer called for wheelchair.

## 2024-10-14 NOTE — H&P
Resource Strain: Not on file   Food Insecurity: Not on file   Transportation Needs: Not on file   Physical Activity: Not on file   Stress: Not on file   Social Connections: Not on file   Intimate Partner Violence: Not on file   Housing Stability: Not on file       FAMILY HISTORY:   History reviewed. No pertinent family history.    REVIEW OF SYSTEMS:  A 12 system review of systems was performed, all pertinent positives and negative were documented in the HPI, otherwise rest were negative.    PHYSICAL EXAM:    /72   Pulse 56   Temp 97.6 °F (36.4 °C) (Temporal)   Resp 16   Ht 1.702 m (5' 7\")   Wt 61.2 kg (135 lb)   SpO2 99%   BMI 21.14 kg/m²     General:  Alert and oriented x 3. No acute distress, pleasant conscious cooperative.  Nontoxic in appearance.  HEENT:  Sclera are anicteric and conjunctiva are normal.  Normocephalic, no bitemporal wasting. Hearing is adequate.  Moist oropharynx without thrush.  No oral ulcers are seen.    Neck is supple without masses.  No palpable cervical or supraclavicular lymph nodes  Lungs:  Clear bilaterally, diminished at the bases with no rales, wheezes, or rhonchi. Unlabored breathing pattern with adequate aeration.  Heart:  Regular rate and rhythm.  There are no murmurs.  Abdomen:  Normal bowel sounds.  soft, non distended, non tender, no guarding, rebound, or rigidity. There are no palpable masses, hepatosplenomegaly.   Extemities: There is no clubbing or edema.  No gross deformity noted.   Skin:  No rashes or lesions appreciated.  There is no jaundice.  Neuro:  CN 2-12 intact bilaterally, no obvious focal neurologic deficits.  Musculoskeletal:  Normal muscle tone, no joint swelling,     Lab and Imaging Review   No results for input(s): \"WBC\", \"HGB\", \"MCV\", \"PLT\", \"INR\", \"NA\", \"K\", \"CL\", \"CO2\", \"BUN\", \"CREATININE\", \"GLUCOSE\", \"CALCIUM\", \"LABALBU\", \"AST\", \"ALT\", \"ALKPHOS\", \"BILITOT\", \"BILIDIR\", \"AMMONIA\", \"AMYLASE\", \"LIPASE\", \"MG\" in the last 72 hours.    Invalid

## 2024-10-16 LAB — SURGICAL PATHOLOGY REPORT: NORMAL

## (undated) DEVICE — ENDOSCOPY KIT: Brand: MEDLINE INDUSTRIES, INC.

## (undated) DEVICE — FORCEPS BX L240CM JAW DIA2.8MM L CAP W/ NDL MIC MESH TOOTH